# Patient Record
Sex: FEMALE | Race: WHITE | Employment: FULL TIME | URBAN - METROPOLITAN AREA
[De-identification: names, ages, dates, MRNs, and addresses within clinical notes are randomized per-mention and may not be internally consistent; named-entity substitution may affect disease eponyms.]

---

## 2017-03-13 RX ORDER — LORATADINE 10 MG
TABLET ORAL
Qty: 30 TABLET | Refills: 0 | Status: SHIPPED | OUTPATIENT
Start: 2017-03-13 | End: 2017-04-11 | Stop reason: SDUPTHER

## 2017-03-23 ENCOUNTER — OFFICE VISIT (OUTPATIENT)
Dept: PRIMARY CARE CLINIC | Age: 37
End: 2017-03-23

## 2017-03-23 VITALS
HEIGHT: 65 IN | TEMPERATURE: 98.5 F | DIASTOLIC BLOOD PRESSURE: 72 MMHG | SYSTOLIC BLOOD PRESSURE: 98 MMHG | BODY MASS INDEX: 19.99 KG/M2 | HEART RATE: 80 BPM | WEIGHT: 120 LBS | RESPIRATION RATE: 14 BRPM

## 2017-03-23 DIAGNOSIS — J01.00 ACUTE NON-RECURRENT MAXILLARY SINUSITIS: Primary | ICD-10-CM

## 2017-03-23 DIAGNOSIS — K13.79 MOUTH SORES: ICD-10-CM

## 2017-03-23 DIAGNOSIS — J30.9 ALLERGIC RHINITIS, UNSPECIFIED ALLERGIC RHINITIS TRIGGER, UNSPECIFIED RHINITIS SEASONALITY: ICD-10-CM

## 2017-03-23 PROCEDURE — 99214 OFFICE O/P EST MOD 30 MIN: CPT | Performed by: FAMILY MEDICINE

## 2017-03-23 RX ORDER — VALACYCLOVIR HYDROCHLORIDE 1 G/1
1000 TABLET, FILM COATED ORAL 2 TIMES DAILY
Qty: 20 TABLET | Refills: 3 | Status: SHIPPED | OUTPATIENT
Start: 2017-03-23 | End: 2017-05-13 | Stop reason: SDUPTHER

## 2017-03-23 RX ORDER — AMOXICILLIN AND CLAVULANATE POTASSIUM 875; 125 MG/1; MG/1
1 TABLET, FILM COATED ORAL 2 TIMES DAILY
Qty: 20 TABLET | Refills: 0 | Status: SHIPPED | OUTPATIENT
Start: 2017-03-23 | End: 2017-04-02

## 2017-03-23 ASSESSMENT — ENCOUNTER SYMPTOMS
COUGH: 0
CHEST TIGHTNESS: 0
ABDOMINAL PAIN: 0
WHEEZING: 0
DIARRHEA: 0
CONSTIPATION: 0
EYE DISCHARGE: 0
NAUSEA: 0
EYE REDNESS: 0
COLOR CHANGE: 0
SORE THROAT: 0
PHOTOPHOBIA: 0
EYE PAIN: 0
APNEA: 0
STRIDOR: 0
RHINORRHEA: 1
CHOKING: 0
FACIAL SWELLING: 0
SINUS PAIN: 1
VOMITING: 0
SWOLLEN GLANDS: 0

## 2017-04-11 RX ORDER — LORATADINE 10 MG
TABLET ORAL
Qty: 30 TABLET | Refills: 3 | Status: SHIPPED | OUTPATIENT
Start: 2017-04-11 | End: 2017-08-16 | Stop reason: SDUPTHER

## 2017-07-05 DIAGNOSIS — Z01.89 ENCOUNTER FOR BLOOD TEST: ICD-10-CM

## 2017-07-05 LAB
ABO/RH: NORMAL
ANTIBODY SCREEN: NORMAL

## 2017-08-16 RX ORDER — LORATADINE 10 MG
TABLET ORAL
Qty: 30 TABLET | Refills: 5 | Status: SHIPPED | OUTPATIENT
Start: 2017-08-16 | End: 2018-04-12 | Stop reason: SDUPTHER

## 2018-02-28 RX ORDER — LORATADINE 10 MG
TABLET ORAL
Qty: 30 TABLET | Refills: 0 | OUTPATIENT
Start: 2018-02-28

## 2018-02-28 NOTE — TELEPHONE ENCOUNTER
Patient was last seen 3-23-17    Patient is in need of an appointment      Please verify if the patient will need a short supply until their next appointment to go to their LOCAL pharmacy

## 2018-04-12 ENCOUNTER — OFFICE VISIT (OUTPATIENT)
Dept: PRIMARY CARE CLINIC | Age: 38
End: 2018-04-12
Payer: MEDICARE

## 2018-04-12 VITALS
BODY MASS INDEX: 19.66 KG/M2 | RESPIRATION RATE: 12 BRPM | HEIGHT: 65 IN | OXYGEN SATURATION: 99 % | WEIGHT: 118 LBS | HEART RATE: 100 BPM | TEMPERATURE: 98 F | DIASTOLIC BLOOD PRESSURE: 60 MMHG | SYSTOLIC BLOOD PRESSURE: 90 MMHG

## 2018-04-12 DIAGNOSIS — G24.5 EYE TWITCH: Primary | ICD-10-CM

## 2018-04-12 DIAGNOSIS — G43.829 MENSTRUAL MIGRAINE WITHOUT STATUS MIGRAINOSUS, NOT INTRACTABLE: ICD-10-CM

## 2018-04-12 DIAGNOSIS — Z12.4 CERVICAL CANCER SCREENING: ICD-10-CM

## 2018-04-12 DIAGNOSIS — H93.8X3 EAR CONGESTION, BILATERAL: ICD-10-CM

## 2018-04-12 DIAGNOSIS — J01.40 ACUTE NON-RECURRENT PANSINUSITIS: ICD-10-CM

## 2018-04-12 PROCEDURE — G8420 CALC BMI NORM PARAMETERS: HCPCS | Performed by: FAMILY MEDICINE

## 2018-04-12 PROCEDURE — 99214 OFFICE O/P EST MOD 30 MIN: CPT | Performed by: FAMILY MEDICINE

## 2018-04-12 PROCEDURE — G8427 DOCREV CUR MEDS BY ELIG CLIN: HCPCS | Performed by: FAMILY MEDICINE

## 2018-04-12 PROCEDURE — 1036F TOBACCO NON-USER: CPT | Performed by: FAMILY MEDICINE

## 2018-04-12 RX ORDER — AMOXICILLIN AND CLAVULANATE POTASSIUM 875; 125 MG/1; MG/1
1 TABLET, FILM COATED ORAL 2 TIMES DAILY
Qty: 20 TABLET | Refills: 0 | Status: SHIPPED | OUTPATIENT
Start: 2018-04-12 | End: 2018-06-18

## 2018-04-12 RX ORDER — LORATADINE AND PSEUDOEPHEDRINE 10; 240 MG/1; MG/1
TABLET, EXTENDED RELEASE ORAL
Qty: 30 TABLET | Refills: 5 | Status: SHIPPED | OUTPATIENT
Start: 2018-04-12 | End: 2018-09-24 | Stop reason: SDUPTHER

## 2018-04-12 RX ORDER — NARATRIPTAN 2.5 MG/1
2.5 TABLET ORAL
Qty: 9 TABLET | Refills: 1 | Status: SHIPPED | OUTPATIENT
Start: 2018-04-12 | End: 2018-04-12

## 2018-04-12 RX ORDER — LEVOFLOXACIN 500 MG/1
500 TABLET, FILM COATED ORAL DAILY
Qty: 10 TABLET | Refills: 0 | Status: CANCELLED | OUTPATIENT
Start: 2018-04-12 | End: 2018-04-22

## 2018-04-12 RX ORDER — FLUTICASONE PROPIONATE 50 MCG
2 SPRAY, SUSPENSION (ML) NASAL NIGHTLY
Qty: 1 BOTTLE | Refills: 2 | Status: SHIPPED | OUTPATIENT
Start: 2018-04-12 | End: 2018-07-10 | Stop reason: SDUPTHER

## 2018-04-12 ASSESSMENT — PATIENT HEALTH QUESTIONNAIRE - PHQ9
1. LITTLE INTEREST OR PLEASURE IN DOING THINGS: 0
SUM OF ALL RESPONSES TO PHQ9 QUESTIONS 1 & 2: 0
2. FEELING DOWN, DEPRESSED OR HOPELESS: 0
SUM OF ALL RESPONSES TO PHQ QUESTIONS 1-9: 0

## 2018-04-12 ASSESSMENT — ENCOUNTER SYMPTOMS
PHOTOPHOBIA: 0
WHEEZING: 0
SINUS PRESSURE: 1
EYE ITCHING: 0
SORE THROAT: 0
SWOLLEN GLANDS: 0
HOARSE VOICE: 0
ABDOMINAL PAIN: 0
EYE REDNESS: 0
GASTROINTESTINAL NEGATIVE: 1
CONSTIPATION: 0
EYE PAIN: 0
DIARRHEA: 0
SHORTNESS OF BREATH: 0
COUGH: 0
BACK PAIN: 0
RESPIRATORY NEGATIVE: 1

## 2018-06-18 ENCOUNTER — OFFICE VISIT (OUTPATIENT)
Dept: OBGYN CLINIC | Age: 38
End: 2018-06-18
Payer: MEDICARE

## 2018-06-18 VITALS
WEIGHT: 120 LBS | HEIGHT: 65 IN | BODY MASS INDEX: 19.99 KG/M2 | DIASTOLIC BLOOD PRESSURE: 74 MMHG | SYSTOLIC BLOOD PRESSURE: 116 MMHG

## 2018-06-18 DIAGNOSIS — Z01.419 WELL WOMAN EXAM WITH ROUTINE GYNECOLOGICAL EXAM: Primary | ICD-10-CM

## 2018-06-18 DIAGNOSIS — Z11.51 SCREENING FOR HPV (HUMAN PAPILLOMAVIRUS): ICD-10-CM

## 2018-06-18 DIAGNOSIS — R10.2 PELVIC PAIN IN FEMALE: ICD-10-CM

## 2018-06-18 PROCEDURE — 99385 PREV VISIT NEW AGE 18-39: CPT | Performed by: OBSTETRICS & GYNECOLOGY

## 2018-06-26 ENCOUNTER — HOSPITAL ENCOUNTER (OUTPATIENT)
Dept: ULTRASOUND IMAGING | Age: 38
Discharge: HOME | End: 2018-06-28
Payer: MEDICARE

## 2018-06-26 DIAGNOSIS — R10.2 PELVIC PAIN IN FEMALE: ICD-10-CM

## 2018-06-26 PROCEDURE — 76856 US EXAM PELVIC COMPLETE: CPT

## 2018-06-26 PROCEDURE — 76830 TRANSVAGINAL US NON-OB: CPT

## 2018-06-27 ENCOUNTER — TELEPHONE (OUTPATIENT)
Dept: OBGYN CLINIC | Age: 38
End: 2018-06-27

## 2018-06-27 DIAGNOSIS — Z11.51 SCREENING FOR HPV (HUMAN PAPILLOMAVIRUS): ICD-10-CM

## 2018-06-27 DIAGNOSIS — Z01.419 WELL WOMAN EXAM WITH ROUTINE GYNECOLOGICAL EXAM: ICD-10-CM

## 2018-07-10 DIAGNOSIS — J01.40 ACUTE NON-RECURRENT PANSINUSITIS: ICD-10-CM

## 2018-07-10 DIAGNOSIS — H93.8X3 EAR CONGESTION, BILATERAL: ICD-10-CM

## 2018-07-10 RX ORDER — FLUTICASONE PROPIONATE 50 MCG
SPRAY, SUSPENSION (ML) NASAL
Qty: 1 BOTTLE | Refills: 2 | Status: SHIPPED | OUTPATIENT
Start: 2018-07-10 | End: 2018-09-24 | Stop reason: SDUPTHER

## 2018-07-10 NOTE — TELEPHONE ENCOUNTER
Patient was last seen on 4-12-18  Last Prescribed 4-12-18    Medication is pending  Please approve or deny this request

## 2018-09-24 ENCOUNTER — OFFICE VISIT (OUTPATIENT)
Dept: PRIMARY CARE CLINIC | Age: 38
End: 2018-09-24
Payer: MEDICARE

## 2018-09-24 VITALS
HEART RATE: 81 BPM | RESPIRATION RATE: 14 BRPM | SYSTOLIC BLOOD PRESSURE: 110 MMHG | WEIGHT: 117 LBS | TEMPERATURE: 97.9 F | HEIGHT: 65 IN | BODY MASS INDEX: 19.49 KG/M2 | DIASTOLIC BLOOD PRESSURE: 64 MMHG | OXYGEN SATURATION: 99 %

## 2018-09-24 DIAGNOSIS — H92.03 OTALGIA OF BOTH EARS: ICD-10-CM

## 2018-09-24 DIAGNOSIS — J30.9 ALLERGIC RHINITIS, UNSPECIFIED SEASONALITY, UNSPECIFIED TRIGGER: Primary | ICD-10-CM

## 2018-09-24 DIAGNOSIS — H69.83 DYSFUNCTION OF BOTH EUSTACHIAN TUBES: ICD-10-CM

## 2018-09-24 DIAGNOSIS — H93.8X3 EAR CONGESTION, BILATERAL: ICD-10-CM

## 2018-09-24 DIAGNOSIS — J01.40 ACUTE NON-RECURRENT PANSINUSITIS: ICD-10-CM

## 2018-09-24 PROCEDURE — G8427 DOCREV CUR MEDS BY ELIG CLIN: HCPCS | Performed by: FAMILY MEDICINE

## 2018-09-24 PROCEDURE — G8420 CALC BMI NORM PARAMETERS: HCPCS | Performed by: FAMILY MEDICINE

## 2018-09-24 PROCEDURE — 1036F TOBACCO NON-USER: CPT | Performed by: FAMILY MEDICINE

## 2018-09-24 PROCEDURE — 99213 OFFICE O/P EST LOW 20 MIN: CPT | Performed by: FAMILY MEDICINE

## 2018-09-24 RX ORDER — AZELASTINE 1 MG/ML
2 SPRAY, METERED NASAL 2 TIMES DAILY
Qty: 1 BOTTLE | Refills: 3 | Status: SHIPPED | OUTPATIENT
Start: 2018-09-24 | End: 2018-11-17

## 2018-09-24 RX ORDER — CEFUROXIME AXETIL 500 MG/1
500 TABLET ORAL 2 TIMES DAILY
Qty: 20 TABLET | Refills: 0 | Status: SHIPPED | OUTPATIENT
Start: 2018-09-24 | End: 2018-10-04

## 2018-09-24 RX ORDER — MONTELUKAST SODIUM 10 MG/1
10 TABLET ORAL NIGHTLY
Qty: 30 TABLET | Refills: 3 | Status: SHIPPED | OUTPATIENT
Start: 2018-09-24 | End: 2018-11-17

## 2018-09-24 RX ORDER — FLUTICASONE PROPIONATE 50 MCG
1 SPRAY, SUSPENSION (ML) NASAL DAILY
Qty: 1 BOTTLE | Refills: 2 | Status: SHIPPED | OUTPATIENT
Start: 2018-09-24 | End: 2019-03-19 | Stop reason: SDUPTHER

## 2018-09-24 RX ORDER — LORATADINE AND PSEUDOEPHEDRINE 10; 240 MG/1; MG/1
TABLET, EXTENDED RELEASE ORAL
Qty: 30 TABLET | Refills: 5 | Status: SHIPPED | OUTPATIENT
Start: 2018-09-24 | End: 2019-04-05 | Stop reason: SDUPTHER

## 2018-09-24 ASSESSMENT — ENCOUNTER SYMPTOMS
COLOR CHANGE: 0
CONSTIPATION: 0
COUGH: 0
RHINORRHEA: 0
EYE DISCHARGE: 0
CHEST TIGHTNESS: 0
ABDOMINAL PAIN: 0
DIARRHEA: 0
EYE REDNESS: 0
FACIAL SWELLING: 0
EYE PAIN: 0
STRIDOR: 0
APNEA: 0
VOMITING: 0
SORE THROAT: 0
PHOTOPHOBIA: 0
WHEEZING: 0
CHOKING: 0
NAUSEA: 0

## 2018-09-24 NOTE — PROGRESS NOTES
Narrative    No narrative on file     Allergies:  Cefdinir; Cephalexin; Levofloxacin; Sulfamethoxazole-trimethoprim; and Septra [bactrim]    Review of Systems   Constitutional: Negative for activity change, appetite change, chills, diaphoresis, fatigue and fever. HENT: Positive for ear pain and hearing loss (since resolved). Negative for congestion, ear discharge, facial swelling, mouth sores, rhinorrhea and sore throat. Eyes: Negative for photophobia, pain, discharge and redness. Respiratory: Negative for apnea, cough, choking, chest tightness, wheezing and stridor. Cardiovascular: Negative for chest pain, palpitations and leg swelling. Gastrointestinal: Negative for abdominal pain, constipation, diarrhea, nausea and vomiting. Endocrine: Negative for cold intolerance, heat intolerance, polydipsia and polyphagia. Genitourinary: Negative for dysuria and frequency. Musculoskeletal: Negative for gait problem, joint swelling, neck pain and neck stiffness. Skin: Negative for color change, pallor, rash and wound. Allergic/Immunologic: Negative for immunocompromised state. Neurological: Negative for tremors, syncope, facial asymmetry, speech difficulty and headaches. Psychiatric/Behavioral: Negative for confusion and hallucinations. The patient is not nervous/anxious and is not hyperactive. Objective:   /64 (Site: Left Upper Arm, Position: Sitting, Cuff Size: Medium Adult)   Pulse 81   Temp 97.9 °F (36.6 °C) (Oral)   Resp 14   Ht 5' 5\" (1.651 m)   Wt 117 lb (53.1 kg)   LMP 09/23/2018   SpO2 99%   BMI 19.47 kg/m²     Physical Exam   Constitutional: She is oriented to person, place, and time. She appears well-developed and well-nourished. No distress. HENT:   Head: Normocephalic and atraumatic. Right Ear: Hearing and external ear normal. No drainage, swelling or tenderness. Tympanic membrane is not injected and not bulging. A middle ear effusion is present. No hemotympanum.

## 2018-10-18 DIAGNOSIS — H93.8X3 EAR CONGESTION, BILATERAL: ICD-10-CM

## 2018-10-18 RX ORDER — LORATADINE 10 MG
TABLET ORAL
Qty: 30 TABLET | Refills: 0 | Status: SHIPPED | OUTPATIENT
Start: 2018-10-18 | End: 2018-11-17 | Stop reason: SDUPTHER

## 2018-11-17 ENCOUNTER — OFFICE VISIT (OUTPATIENT)
Dept: PRIMARY CARE CLINIC | Age: 38
End: 2018-11-17
Payer: MEDICARE

## 2018-11-17 VITALS
OXYGEN SATURATION: 97 % | DIASTOLIC BLOOD PRESSURE: 78 MMHG | RESPIRATION RATE: 14 BRPM | HEIGHT: 65 IN | SYSTOLIC BLOOD PRESSURE: 120 MMHG | TEMPERATURE: 98 F | WEIGHT: 121.9 LBS | BODY MASS INDEX: 20.31 KG/M2 | HEART RATE: 87 BPM

## 2018-11-17 DIAGNOSIS — J01.00 ACUTE NON-RECURRENT MAXILLARY SINUSITIS: Primary | ICD-10-CM

## 2018-11-17 PROCEDURE — 1036F TOBACCO NON-USER: CPT | Performed by: PHYSICIAN ASSISTANT

## 2018-11-17 PROCEDURE — 99213 OFFICE O/P EST LOW 20 MIN: CPT | Performed by: PHYSICIAN ASSISTANT

## 2018-11-17 PROCEDURE — G8484 FLU IMMUNIZE NO ADMIN: HCPCS | Performed by: PHYSICIAN ASSISTANT

## 2018-11-17 PROCEDURE — G8420 CALC BMI NORM PARAMETERS: HCPCS | Performed by: PHYSICIAN ASSISTANT

## 2018-11-17 PROCEDURE — G8427 DOCREV CUR MEDS BY ELIG CLIN: HCPCS | Performed by: PHYSICIAN ASSISTANT

## 2018-11-17 RX ORDER — AMOXICILLIN AND CLAVULANATE POTASSIUM 875; 125 MG/1; MG/1
1 TABLET, FILM COATED ORAL 2 TIMES DAILY
Qty: 20 TABLET | Refills: 0 | Status: SHIPPED | OUTPATIENT
Start: 2018-11-17 | End: 2018-11-27

## 2018-11-17 ASSESSMENT — ENCOUNTER SYMPTOMS
SHORTNESS OF BREATH: 0
HOARSE VOICE: 0
SINUS PRESSURE: 1
SWOLLEN GLANDS: 0
SORE THROAT: 1
COUGH: 1

## 2019-01-30 ENCOUNTER — OFFICE VISIT (OUTPATIENT)
Dept: PRIMARY CARE CLINIC | Age: 39
End: 2019-01-30
Payer: MEDICARE

## 2019-01-30 VITALS
RESPIRATION RATE: 16 BRPM | DIASTOLIC BLOOD PRESSURE: 78 MMHG | WEIGHT: 122 LBS | HEART RATE: 103 BPM | TEMPERATURE: 98.4 F | OXYGEN SATURATION: 98 % | BODY MASS INDEX: 20.33 KG/M2 | SYSTOLIC BLOOD PRESSURE: 116 MMHG | HEIGHT: 65 IN

## 2019-01-30 DIAGNOSIS — L98.9 SKIN LESION: Primary | ICD-10-CM

## 2019-01-30 PROCEDURE — G8427 DOCREV CUR MEDS BY ELIG CLIN: HCPCS | Performed by: PHYSICIAN ASSISTANT

## 2019-01-30 PROCEDURE — 99213 OFFICE O/P EST LOW 20 MIN: CPT | Performed by: PHYSICIAN ASSISTANT

## 2019-01-30 PROCEDURE — G8484 FLU IMMUNIZE NO ADMIN: HCPCS | Performed by: PHYSICIAN ASSISTANT

## 2019-01-30 PROCEDURE — 1036F TOBACCO NON-USER: CPT | Performed by: PHYSICIAN ASSISTANT

## 2019-01-30 PROCEDURE — G8420 CALC BMI NORM PARAMETERS: HCPCS | Performed by: PHYSICIAN ASSISTANT

## 2019-01-30 RX ORDER — CLOTRIMAZOLE AND BETAMETHASONE DIPROPIONATE 10; .64 MG/G; MG/G
CREAM TOPICAL
Qty: 30 G | Refills: 0 | Status: SHIPPED | OUTPATIENT
Start: 2019-01-30

## 2019-01-30 RX ORDER — PRENATAL VIT 91/IRON/FOLIC/DHA 28-975-200
COMBINATION PACKAGE (EA) ORAL
Qty: 30 G | Refills: 0 | Status: CANCELLED | OUTPATIENT
Start: 2019-01-30

## 2019-01-30 ASSESSMENT — ENCOUNTER SYMPTOMS
DIARRHEA: 0
SORE THROAT: 0
RHINORRHEA: 0
EYE PAIN: 0
SHORTNESS OF BREATH: 0
NAIL CHANGES: 0

## 2019-03-19 DIAGNOSIS — J30.9 ALLERGIC RHINITIS, UNSPECIFIED SEASONALITY, UNSPECIFIED TRIGGER: ICD-10-CM

## 2019-03-19 DIAGNOSIS — H93.8X3 EAR CONGESTION, BILATERAL: ICD-10-CM

## 2019-03-19 DIAGNOSIS — J01.40 ACUTE NON-RECURRENT PANSINUSITIS: ICD-10-CM

## 2019-03-19 RX ORDER — FLUTICASONE PROPIONATE 50 MCG
SPRAY, SUSPENSION (ML) NASAL
Qty: 1 BOTTLE | Refills: 5 | Status: SHIPPED | OUTPATIENT
Start: 2019-03-19

## 2019-04-05 DIAGNOSIS — H93.8X3 EAR CONGESTION, BILATERAL: ICD-10-CM

## 2019-04-05 DIAGNOSIS — J30.9 ALLERGIC RHINITIS, UNSPECIFIED SEASONALITY, UNSPECIFIED TRIGGER: ICD-10-CM

## 2019-04-05 RX ORDER — LORATADINE AND PSEUDOEPHEDRINE 10; 240 MG/1; MG/1
TABLET, EXTENDED RELEASE ORAL
Qty: 30 TABLET | Refills: 0 | Status: SHIPPED | OUTPATIENT
Start: 2019-04-05

## 2019-04-05 NOTE — TELEPHONE ENCOUNTER
Ledy pt requesting refill, aware of 30-day courtesy supply, will try to f/u with Dr. Anneliese Hughes NA

## 2023-02-10 PROBLEM — R51.9 HEADACHE: Status: ACTIVE | Noted: 2023-02-10

## 2023-02-10 PROBLEM — R52 BODY ACHES: Status: ACTIVE | Noted: 2023-02-10

## 2023-02-10 PROBLEM — R30.0 DYSURIA: Status: ACTIVE | Noted: 2023-02-10

## 2023-02-10 PROBLEM — L29.9 ITCHY SKIN: Status: ACTIVE | Noted: 2023-02-10

## 2023-02-10 PROBLEM — N63.0 BREAST LUMP: Status: ACTIVE | Noted: 2023-02-10

## 2023-02-10 PROBLEM — M54.50 LOWER BACK PAIN: Status: ACTIVE | Noted: 2023-02-10

## 2023-02-10 PROBLEM — R19.7 INTRACTABLE DIARRHEA: Status: ACTIVE | Noted: 2023-02-10

## 2023-02-10 PROBLEM — R68.89 FLU-LIKE SYMPTOMS: Status: ACTIVE | Noted: 2023-02-10

## 2023-02-10 PROBLEM — R11.0 NAUSEA IN ADULT: Status: ACTIVE | Noted: 2023-02-10

## 2023-02-10 PROBLEM — J01.90 ACUTE SINUSITIS: Status: ACTIVE | Noted: 2023-02-10

## 2023-02-10 PROBLEM — M62.838 NIGHT MUSCLE SPASMS: Status: ACTIVE | Noted: 2023-02-10

## 2023-02-10 PROBLEM — K52.9 GASTROENTERITIS: Status: ACTIVE | Noted: 2023-02-10

## 2023-02-10 PROBLEM — N39.0 UTI (URINARY TRACT INFECTION): Status: ACTIVE | Noted: 2023-02-10

## 2023-02-10 PROBLEM — K52.9 AGE (ACUTE GASTROENTERITIS): Status: ACTIVE | Noted: 2023-02-10

## 2023-02-10 PROBLEM — R10.9 ABDOMINAL PAIN: Status: ACTIVE | Noted: 2023-02-10

## 2023-02-10 PROBLEM — B35.4 TINEA CORPORIS: Status: ACTIVE | Noted: 2023-02-10

## 2023-02-10 PROBLEM — R23.8 IRRITATION SYMPTOM OF SKIN: Status: ACTIVE | Noted: 2023-02-10

## 2023-02-10 PROBLEM — H11.30 SUBCONJUNCTIVAL HEMORRHAGE: Status: ACTIVE | Noted: 2023-02-10

## 2023-02-10 PROBLEM — M25.50 JOINT PAIN: Status: ACTIVE | Noted: 2023-02-10

## 2023-02-10 PROBLEM — K52.9 COLITIS: Status: ACTIVE | Noted: 2023-02-10

## 2023-02-10 PROBLEM — R53.83 FATIGUE: Status: ACTIVE | Noted: 2023-02-10

## 2023-02-10 PROBLEM — H10.10 ALLERGIC CONJUNCTIVITIS: Status: ACTIVE | Noted: 2023-02-10

## 2023-02-10 PROBLEM — U07.1 COVID-19 VIRUS INFECTION: Status: ACTIVE | Noted: 2023-02-10

## 2023-02-10 PROBLEM — J30.9 ALLERGIC RHINITIS: Status: ACTIVE | Noted: 2023-02-10

## 2023-02-10 PROBLEM — R19.7 DIARRHEA: Status: ACTIVE | Noted: 2023-02-10

## 2023-02-10 PROBLEM — B35.1 ONYCHOMYCOSIS: Status: ACTIVE | Noted: 2023-02-10

## 2023-02-10 PROBLEM — R05.9 COUGH: Status: ACTIVE | Noted: 2023-02-10

## 2023-02-10 PROBLEM — J02.9 SORE THROAT: Status: ACTIVE | Noted: 2023-02-10

## 2023-02-10 PROBLEM — M26.69 TMJ INFLAMMATION: Status: ACTIVE | Noted: 2023-02-10

## 2023-02-10 PROBLEM — J06.9 URI, ACUTE: Status: ACTIVE | Noted: 2023-02-10

## 2023-02-10 PROBLEM — J01.40 ACUTE NON-RECURRENT PANSINUSITIS: Status: ACTIVE | Noted: 2023-02-10

## 2023-02-10 RX ORDER — FLUTICASONE PROPIONATE 50 MCG
1 SPRAY, SUSPENSION (ML) NASAL DAILY
COMMUNITY
Start: 2018-10-18 | End: 2023-06-29 | Stop reason: SDUPTHER

## 2023-02-10 RX ORDER — LORATADINE PSEUDOEPHEDRINE SULFATE 10; 240 MG/1; MG/1
1 TABLET, EXTENDED RELEASE ORAL DAILY
COMMUNITY
End: 2023-06-15 | Stop reason: SDUPTHER

## 2023-02-10 RX ORDER — ALBUTEROL SULFATE 90 UG/1
1-2 AEROSOL, METERED RESPIRATORY (INHALATION)
COMMUNITY

## 2023-02-10 RX ORDER — TRAZODONE HYDROCHLORIDE 50 MG/1
.5-1 TABLET ORAL NIGHTLY
COMMUNITY
End: 2023-09-19 | Stop reason: ALTCHOICE

## 2023-03-07 ENCOUNTER — TELEMEDICINE (OUTPATIENT)
Dept: PRIMARY CARE | Facility: CLINIC | Age: 43
End: 2023-03-07
Payer: MEDICAID

## 2023-03-07 DIAGNOSIS — R53.82 CHRONIC FATIGUE: Primary | Chronic | ICD-10-CM

## 2023-03-07 PROCEDURE — 99213 OFFICE O/P EST LOW 20 MIN: CPT | Performed by: FAMILY MEDICINE

## 2023-03-07 RX ORDER — CEFUROXIME AXETIL 500 MG/1
500 TABLET ORAL 2 TIMES DAILY
Qty: 20 TABLET | Refills: 0 | Status: SHIPPED | OUTPATIENT
Start: 2023-03-07 | End: 2023-03-17

## 2023-03-07 NOTE — PATIENT INSTRUCTIONS
-Continue current medications and therapy for chronic medical conditions.     -reviewed last labs     -start ceftin if needed.

## 2023-03-07 NOTE — PROGRESS NOTES
Subjective   Patient ID: Brii Reeves is a 42 y.o. female who presents for Fatigue (Pt has c.o being run down and tired all the time. Pt would like a script for an antibiotic to keep on hand. )    Review of Systems   All other systems reviewed and are negative.      Objective   There were no vitals taken for this visit.    Physical Exam GEN: pleasant, alert, NAD, talkative and easy to discuss symptoms with.  No Telephonic distress     Assessment/Plan   Problem List Items Addressed This Visit       Fatigue - Primary     Discussed possible wellbutrin in the future if needed.

## 2023-06-15 DIAGNOSIS — J01.10 ACUTE FRONTAL SINUSITIS, RECURRENCE NOT SPECIFIED: ICD-10-CM

## 2023-06-16 RX ORDER — LORATADINE PSEUDOEPHEDRINE SULFATE 10; 240 MG/1; MG/1
1 TABLET, EXTENDED RELEASE ORAL DAILY
Qty: 30 TABLET | Refills: 1 | Status: SHIPPED | OUTPATIENT
Start: 2023-06-16 | End: 2023-09-14 | Stop reason: SDUPTHER

## 2023-06-29 DIAGNOSIS — J30.1 SEASONAL ALLERGIC RHINITIS DUE TO POLLEN: ICD-10-CM

## 2023-06-29 RX ORDER — FLUTICASONE PROPIONATE 50 MCG
1 SPRAY, SUSPENSION (ML) NASAL DAILY
Qty: 16 G | Refills: 3 | Status: SHIPPED | OUTPATIENT
Start: 2023-06-29 | End: 2023-07-07 | Stop reason: SDUPTHER

## 2023-06-29 NOTE — TELEPHONE ENCOUNTER
Pt requests script refill of:   FLUTICASONE 50 MCG/actuation nasal spray    Once daily each nostril    [facility]:   [facility]     Please advise pt @ [phone]

## 2023-07-03 DIAGNOSIS — J30.1 SEASONAL ALLERGIC RHINITIS DUE TO POLLEN: ICD-10-CM

## 2023-07-03 NOTE — TELEPHONE ENCOUNTER
JE WROTE PT'S FLONASE SCRIPT WRONG AND NOW IT ISN'T BEING COVERED.  SHE TAKES 1 PUFF IN EACH NOSTRIL 2XS A DAY.    HOW THE PREVIOUS SCRIPT WAS WROTE

## 2023-07-07 RX ORDER — FLUTICASONE PROPIONATE 50 MCG
1 SPRAY, SUSPENSION (ML) NASAL 2 TIMES DAILY
Qty: 16 G | Refills: 3 | Status: SHIPPED | OUTPATIENT
Start: 2023-07-07 | End: 2023-11-01 | Stop reason: SDUPTHER

## 2023-09-14 DIAGNOSIS — J01.10 ACUTE FRONTAL SINUSITIS, RECURRENCE NOT SPECIFIED: ICD-10-CM

## 2023-09-14 NOTE — TELEPHONE ENCOUNTER
Dr Mcdonald pt    Pt phoned office and is requesting refill on     Loratadine- pseudoephedrine    [facility]

## 2023-09-15 RX ORDER — LORATADINE PSEUDOEPHEDRINE SULFATE 10; 240 MG/1; MG/1
1 TABLET, EXTENDED RELEASE ORAL DAILY
Qty: 30 TABLET | Refills: 1 | Status: SHIPPED | OUTPATIENT
Start: 2023-09-15 | End: 2023-11-01 | Stop reason: SDUPTHER

## 2023-09-19 ENCOUNTER — OFFICE VISIT (OUTPATIENT)
Dept: PRIMARY CARE | Facility: CLINIC | Age: 43
End: 2023-09-19
Payer: MEDICAID

## 2023-09-19 VITALS
SYSTOLIC BLOOD PRESSURE: 124 MMHG | OXYGEN SATURATION: 98 % | HEIGHT: 66 IN | RESPIRATION RATE: 14 BRPM | HEART RATE: 77 BPM | WEIGHT: 127 LBS | DIASTOLIC BLOOD PRESSURE: 78 MMHG | BODY MASS INDEX: 20.41 KG/M2

## 2023-09-19 DIAGNOSIS — J32.9 SINUSITIS, UNSPECIFIED CHRONICITY, UNSPECIFIED LOCATION: ICD-10-CM

## 2023-09-19 PROBLEM — M62.838 NIGHT MUSCLE SPASMS: Status: RESOLVED | Noted: 2023-02-10 | Resolved: 2023-09-19

## 2023-09-19 PROBLEM — H10.10 ALLERGIC CONJUNCTIVITIS: Status: RESOLVED | Noted: 2023-02-10 | Resolved: 2023-09-19

## 2023-09-19 PROBLEM — R19.7 DIARRHEA: Status: RESOLVED | Noted: 2023-02-10 | Resolved: 2023-09-19

## 2023-09-19 PROBLEM — J30.9 ALLERGIC RHINITIS: Status: RESOLVED | Noted: 2023-02-10 | Resolved: 2023-09-19

## 2023-09-19 PROBLEM — U07.1 COVID-19 VIRUS INFECTION: Status: RESOLVED | Noted: 2023-02-10 | Resolved: 2023-09-19

## 2023-09-19 PROBLEM — M26.69 TMJ INFLAMMATION: Status: RESOLVED | Noted: 2023-02-10 | Resolved: 2023-09-19

## 2023-09-19 PROBLEM — R23.8 IRRITATION SYMPTOM OF SKIN: Status: RESOLVED | Noted: 2023-02-10 | Resolved: 2023-09-19

## 2023-09-19 PROBLEM — J06.9 URI, ACUTE: Status: RESOLVED | Noted: 2023-02-10 | Resolved: 2023-09-19

## 2023-09-19 PROBLEM — N39.0 UTI (URINARY TRACT INFECTION): Status: RESOLVED | Noted: 2023-02-10 | Resolved: 2023-09-19

## 2023-09-19 PROBLEM — M25.50 JOINT PAIN: Status: RESOLVED | Noted: 2023-02-10 | Resolved: 2023-09-19

## 2023-09-19 PROBLEM — J02.9 SORE THROAT: Status: RESOLVED | Noted: 2023-02-10 | Resolved: 2023-09-19

## 2023-09-19 PROBLEM — R52 BODY ACHES: Status: RESOLVED | Noted: 2023-02-10 | Resolved: 2023-09-19

## 2023-09-19 PROBLEM — R05.9 COUGH: Status: RESOLVED | Noted: 2023-02-10 | Resolved: 2023-09-19

## 2023-09-19 PROBLEM — H11.30 SUBCONJUNCTIVAL HEMORRHAGE: Status: RESOLVED | Noted: 2023-02-10 | Resolved: 2023-09-19

## 2023-09-19 PROBLEM — J01.90 ACUTE SINUSITIS: Status: RESOLVED | Noted: 2023-02-10 | Resolved: 2023-09-19

## 2023-09-19 PROBLEM — M54.50 LOWER BACK PAIN: Status: RESOLVED | Noted: 2023-02-10 | Resolved: 2023-09-19

## 2023-09-19 PROBLEM — R68.89 FLU-LIKE SYMPTOMS: Status: RESOLVED | Noted: 2023-02-10 | Resolved: 2023-09-19

## 2023-09-19 PROCEDURE — 99213 OFFICE O/P EST LOW 20 MIN: CPT | Performed by: FAMILY MEDICINE

## 2023-09-19 RX ORDER — PREDNISONE 10 MG/1
TABLET ORAL
Qty: 20 TABLET | Refills: 0 | Status: SHIPPED | OUTPATIENT
Start: 2023-09-19 | End: 2023-11-01 | Stop reason: ALTCHOICE

## 2023-09-19 RX ORDER — CEFUROXIME AXETIL 500 MG/1
500 TABLET ORAL 2 TIMES DAILY
Qty: 20 TABLET | Refills: 0 | Status: SHIPPED | OUTPATIENT
Start: 2023-09-19 | End: 2023-09-29

## 2023-09-19 NOTE — PROGRESS NOTES
Subjective   Patient ID: Brii Reeves is a 42 y.o. female who presents for Sinusitis.    Sinusitis     Pt states she has some sinus issues, she states she has pressure in her cheeks.She states she thinks I might be a dry sinusitis. Pt uses mouth guard at night.     12 Systems have been reviewed as follows.  Constitutional: Fever, weight gain, weight loss, appetite change, night sweats, fatigue, chills.  Eyes : blurry, double vision, vision, loss, tearing, redness, pain, sensitivity to light, glaucoma.  Ears: nose, mouth, and throat: Hearing loss, ringing in the ears, ear pain, nasal congestion, nasal drainage, nosebleeds, mouth, throat, irritation tooth problem.  Cardiovascular: chest pain, pressure, heart racing, palpitations, sweating, leg swelling, high or low blood pressure  Pulmonary: Cough, yellow or green sputum, blood and sputum, shortness of breath, wheezing  Gastrointestinal: Nausea, vomiting, diarrhea, constipation, pain, blood in stool, or vomitus, heartburn, difficulty swallowing  Genitourinary: incontinence, abnormal bleeding, abnormal discharge, urinary frequency, urinary hesitancy, pain, impotence sexual problem, infection, urinary retention  Musculoskeletal: Pain, stiffness, joint, redness or warmth, arthritis, back pain, weakness, muscle wasting, sprain or fracture  Neuro: Weight weakness, dizziness, change in voice, change in taste change in vision, change in hearing, loss, or change of sensation, trouble walking, balance problems coordination problems, shaking, speech problem  Endocrine: cold or heat intolerance, blood sugar problem, weight gain or loss missed periods hot flashes, sweats, change in body hair, change in libido, increased thirst, increased urination  Heme/lymph: Swelling, bleeding, problem anemia, bruising, enlarged lymph nodes  Allergic/immunologic: H. plus nasal drip, watery itchy eyes, nasal drainage, immunosuppressed  The above were reviewed and noted negative except as noted  "in HPI and Problem List.      Objective   /78 (BP Location: Left arm, Patient Position: Sitting, BP Cuff Size: Adult)   Pulse 77   Resp 14   Ht 1.676 m (5' 6\")   Wt 57.6 kg (127 lb)   LMP 09/09/2023   SpO2 98%   BMI 20.50 kg/m²     Physical Exam CONSTITUTIONAL- NAD, Pt is A & O x3, Vital signs reviewed per chart.  General Appearance- normal , good hygiene,talks easily  EYES-PERRLA conjunctiva and lids- normal  EARS/NOSE-TM's normal, head normocephalic and atraumatic, naso pharynx-no nasal discharge, no trismus,  oropharynx- normal without exudate  NECK- supple, FROM, without mass  thyroid- NT, normal size, no nodule noted  LYMPH- WNL  CV- RRR without murmur, gallop, or other abnormality.  PULM- CTA bilaterally  Respiratory effort- normal respiratory effort , no retractions or nasal flaring  ABDOMEN- normoactive BS's , soft , NT, no hepatosplenomegaly palpated, or masses. No pulsatile masses noted  extremities no edema,NT  SKIN- no abnormal skin lesions on inspection or palpation  neuro- no focal deficits  PSYCH- pleasant, normal judgement and insight     Assessment/Plan   Problem List Items Addressed This Visit    None  Visit Diagnoses       Sinusitis, unspecified chronicity, unspecified location                 Scribe Attestation  By signing my name below, Lourdes SEN MA  , Scribe   attest that this documentation has been prepared under the direction and in the presence of Jose Mcdonald DO.  "

## 2023-10-15 ENCOUNTER — HOSPITAL ENCOUNTER (EMERGENCY)
Facility: HOSPITAL | Age: 43
Discharge: HOME | End: 2023-10-15
Attending: STUDENT IN AN ORGANIZED HEALTH CARE EDUCATION/TRAINING PROGRAM
Payer: MEDICAID

## 2023-10-15 ENCOUNTER — APPOINTMENT (OUTPATIENT)
Dept: RADIOLOGY | Facility: HOSPITAL | Age: 43
End: 2023-10-15
Payer: MEDICAID

## 2023-10-15 VITALS
HEIGHT: 66 IN | BODY MASS INDEX: 19.29 KG/M2 | TEMPERATURE: 97.7 F | WEIGHT: 120 LBS | SYSTOLIC BLOOD PRESSURE: 130 MMHG | RESPIRATION RATE: 16 BRPM | OXYGEN SATURATION: 100 % | HEART RATE: 80 BPM | DIASTOLIC BLOOD PRESSURE: 70 MMHG
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
## 2023-10-15 PROCEDURE — 70450 CT HEAD/BRAIN W/O DYE: CPT

## 2023-10-15 PROCEDURE — 2500000004 HC RX 250 GENERAL PHARMACY W/ HCPCS (ALT 636 FOR OP/ED): Performed by: STUDENT IN AN ORGANIZED HEALTH CARE EDUCATION/TRAINING PROGRAM

## 2023-10-15 PROCEDURE — 99285 EMERGENCY DEPT VISIT HI MDM: CPT | Mod: 25

## 2023-10-15 PROCEDURE — 73030 X-RAY EXAM OF SHOULDER: CPT | Mod: LT,FR

## 2023-10-15 PROCEDURE — 72125 CT NECK SPINE W/O DYE: CPT | Performed by: RADIOLOGY

## 2023-10-15 PROCEDURE — 73030 X-RAY EXAM OF SHOULDER: CPT | Mod: RT,FR

## 2023-10-15 PROCEDURE — 73030 X-RAY EXAM OF SHOULDER: CPT | Mod: RIGHT SIDE | Performed by: RADIOLOGY

## 2023-10-15 PROCEDURE — 99284 EMERGENCY DEPT VISIT MOD MDM: CPT | Performed by: STUDENT IN AN ORGANIZED HEALTH CARE EDUCATION/TRAINING PROGRAM

## 2023-10-15 PROCEDURE — 72125 CT NECK SPINE W/O DYE: CPT

## 2023-10-15 PROCEDURE — 73030 X-RAY EXAM OF SHOULDER: CPT | Mod: LEFT SIDE | Performed by: RADIOLOGY

## 2023-10-15 PROCEDURE — 70450 CT HEAD/BRAIN W/O DYE: CPT | Performed by: RADIOLOGY

## 2023-10-15 PROCEDURE — 96372 THER/PROPH/DIAG INJ SC/IM: CPT

## 2023-10-15 RX ORDER — KETOROLAC TROMETHAMINE 30 MG/ML
30 INJECTION, SOLUTION INTRAMUSCULAR; INTRAVENOUS ONCE
Status: COMPLETED | OUTPATIENT
Start: 2023-10-15 | End: 2023-10-15

## 2023-10-15 RX ORDER — METHOCARBAMOL 750 MG/1
750 TABLET, FILM COATED ORAL 3 TIMES DAILY PRN
Qty: 10 TABLET | Refills: 0 | Status: SHIPPED | OUTPATIENT
Start: 2023-10-15

## 2023-10-15 RX ADMIN — KETOROLAC TROMETHAMINE 30 MG: 60 INJECTION, SOLUTION INTRAMUSCULAR at 04:20

## 2023-10-15 ASSESSMENT — PAIN SCALES - GENERAL
PAINLEVEL_OUTOF10: 7

## 2023-10-15 ASSESSMENT — PAIN - FUNCTIONAL ASSESSMENT
PAIN_FUNCTIONAL_ASSESSMENT: 0-10
PAIN_FUNCTIONAL_ASSESSMENT: 0-10

## 2023-10-15 ASSESSMENT — PAIN DESCRIPTION - LOCATION: LOCATION: NECK

## 2023-10-15 ASSESSMENT — LIFESTYLE VARIABLES
HAVE YOU EVER FELT YOU SHOULD CUT DOWN ON YOUR DRINKING: NO
EVER HAD A DRINK FIRST THING IN THE MORNING TO STEADY YOUR NERVES TO GET RID OF A HANGOVER: NO
HAVE PEOPLE ANNOYED YOU BY CRITICIZING YOUR DRINKING: NO
REASON UNABLE TO ASSESS: NO
EVER FELT BAD OR GUILTY ABOUT YOUR DRINKING: NO

## 2023-10-15 ASSESSMENT — COLUMBIA-SUICIDE SEVERITY RATING SCALE - C-SSRS
1. IN THE PAST MONTH, HAVE YOU WISHED YOU WERE DEAD OR WISHED YOU COULD GO TO SLEEP AND NOT WAKE UP?: NO
6. HAVE YOU EVER DONE ANYTHING, STARTED TO DO ANYTHING, OR PREPARED TO DO ANYTHING TO END YOUR LIFE?: NO
2. HAVE YOU ACTUALLY HAD ANY THOUGHTS OF KILLING YOURSELF?: NO

## 2023-10-15 NOTE — DISCHARGE INSTRUCTIONS
Return to the ER for suddenly worsening pain or vomiting.  Alternate ibuprofen and Tylenol every 4 hours to help with symptoms.  You can take the muscle relaxers as well.
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
## 2023-11-01 ENCOUNTER — OFFICE VISIT (OUTPATIENT)
Dept: PRIMARY CARE | Facility: CLINIC | Age: 43
End: 2023-11-01
Payer: MEDICAID

## 2023-11-01 VITALS
SYSTOLIC BLOOD PRESSURE: 134 MMHG | BODY MASS INDEX: 20.73 KG/M2 | DIASTOLIC BLOOD PRESSURE: 74 MMHG | OXYGEN SATURATION: 97 % | HEIGHT: 66 IN | WEIGHT: 129 LBS | HEART RATE: 74 BPM
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
## 2023-11-01 DIAGNOSIS — S16.1XXS STRAIN OF NECK MUSCLE, SEQUELA: ICD-10-CM

## 2023-11-01 DIAGNOSIS — S39.012S STRAIN OF LUMBAR REGION, SEQUELA: ICD-10-CM

## 2023-11-01 DIAGNOSIS — J01.10 ACUTE FRONTAL SINUSITIS, RECURRENCE NOT SPECIFIED: ICD-10-CM

## 2023-11-01 DIAGNOSIS — M54.2 NECK PAIN: ICD-10-CM

## 2023-11-01 DIAGNOSIS — J30.1 SEASONAL ALLERGIC RHINITIS DUE TO POLLEN: ICD-10-CM

## 2023-11-01 DIAGNOSIS — M62.838 CERVICAL PARASPINAL MUSCLE SPASM: ICD-10-CM

## 2023-11-01 DIAGNOSIS — R11.0 NAUSEA: ICD-10-CM

## 2023-11-01 PROBLEM — R51.9 HEADACHE: Status: RESOLVED | Noted: 2023-02-10 | Resolved: 2023-11-01

## 2023-11-01 PROBLEM — L29.9 ITCHY SKIN: Status: RESOLVED | Noted: 2023-02-10 | Resolved: 2023-11-01

## 2023-11-01 PROCEDURE — 99214 OFFICE O/P EST MOD 30 MIN: CPT | Performed by: FAMILY MEDICINE

## 2023-11-01 RX ORDER — FLUTICASONE PROPIONATE 50 MCG
1 SPRAY, SUSPENSION (ML) NASAL 2 TIMES DAILY
Qty: 16 G | Refills: 3 | Status: SHIPPED | OUTPATIENT
Start: 2023-11-01 | End: 2024-02-20

## 2023-11-01 RX ORDER — METAXALONE 800 MG/1
800 TABLET ORAL 2 TIMES DAILY
Qty: 30 TABLET | Refills: 0 | Status: SHIPPED | OUTPATIENT
Start: 2023-11-01 | End: 2023-11-20 | Stop reason: SDUPTHER

## 2023-11-01 RX ORDER — LORATADINE PSEUDOEPHEDRINE SULFATE 10; 240 MG/1; MG/1
1 TABLET, EXTENDED RELEASE ORAL DAILY
Qty: 30 TABLET | Refills: 1 | Status: SHIPPED | OUTPATIENT
Start: 2023-11-01 | End: 2023-12-05 | Stop reason: SDUPTHER

## 2023-11-01 RX ORDER — METHOCARBAMOL 500 MG/1
500 TABLET, FILM COATED ORAL NIGHTLY
Qty: 30 TABLET | Refills: 1 | Status: SHIPPED | OUTPATIENT
Start: 2023-11-01 | End: 2023-12-21 | Stop reason: SDUPTHER

## 2023-11-01 RX ORDER — KETOROLAC TROMETHAMINE 10 MG/1
10 TABLET, FILM COATED ORAL EVERY 6 HOURS PRN
Qty: 20 TABLET | Refills: 0 | Status: SHIPPED | OUTPATIENT
Start: 2023-11-01 | End: 2023-11-06

## 2023-11-01 RX ORDER — PROCHLORPERAZINE MALEATE 10 MG
10 TABLET ORAL 3 TIMES DAILY PRN
Qty: 20 TABLET | Refills: 0 | Status: SHIPPED | OUTPATIENT
Start: 2023-11-01 | End: 2023-12-31
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
## 2023-11-07 ENCOUNTER — EVALUATION (OUTPATIENT)
Dept: PHYSICAL THERAPY | Facility: CLINIC | Age: 43
End: 2023-11-07
Payer: MEDICAID

## 2023-11-07 DIAGNOSIS — S39.012S STRAIN OF LUMBAR REGION, SEQUELA: ICD-10-CM

## 2023-11-07 DIAGNOSIS — M54.2 NECK PAIN: ICD-10-CM

## 2023-11-07 DIAGNOSIS — S16.1XXS STRAIN OF NECK MUSCLE, SEQUELA: ICD-10-CM

## 2023-11-07 DIAGNOSIS — M62.838 CERVICAL PARASPINAL MUSCLE SPASM: ICD-10-CM

## 2023-11-07 PROBLEM — S39.012A STRAIN OF LUMBAR REGION: Status: ACTIVE | Noted: 2023-11-07

## 2023-11-07 PROBLEM — S16.1XXA CERVICAL STRAIN: Status: ACTIVE | Noted: 2023-11-07

## 2023-11-07 PROCEDURE — 97162 PT EVAL MOD COMPLEX 30 MIN: CPT | Mod: GP | Performed by: PHYSICAL THERAPIST

## 2023-11-07 PROCEDURE — 97535 SELF CARE MNGMENT TRAINING: CPT | Mod: GP | Performed by: PHYSICAL THERAPIST

## 2023-11-07 PROCEDURE — 97110 THERAPEUTIC EXERCISES: CPT | Mod: GP | Performed by: PHYSICAL THERAPIST

## 2023-11-07 ASSESSMENT — PATIENT HEALTH QUESTIONNAIRE - PHQ9
SUM OF ALL RESPONSES TO PHQ9 QUESTIONS 1 AND 2: 2
10. IF YOU CHECKED OFF ANY PROBLEMS, HOW DIFFICULT HAVE THESE PROBLEMS MADE IT FOR YOU TO DO YOUR WORK, TAKE CARE OF THINGS AT HOME, OR GET ALONG WITH OTHER PEOPLE: SOMEWHAT DIFFICULT
2. FEELING DOWN, DEPRESSED OR HOPELESS: SEVERAL DAYS
1. LITTLE INTEREST OR PLEASURE IN DOING THINGS: SEVERAL DAYS
SUM OF ALL RESPONSES TO PHQ9 QUESTIONS 1 AND 2: 1
2. FEELING DOWN, DEPRESSED OR HOPELESS: NOT AT ALL
1. LITTLE INTEREST OR PLEASURE IN DOING THINGS: SEVERAL DAYS
10. IF YOU CHECKED OFF ANY PROBLEMS, HOW DIFFICULT HAVE THESE PROBLEMS MADE IT FOR YOU TO DO YOUR WORK, TAKE CARE OF THINGS AT HOME, OR GET ALONG WITH OTHER PEOPLE: EXTREMELY DIFFICULT

## 2023-11-07 NOTE — PROGRESS NOTES
Physical Therapy      Cervical AROM (* indicates pain)  Flex:   Ext:   SB: L ; R  Rotation: L ; R    Distraction:  Spurlings:  ULTT Median:    Shoulder PROM (* indicates pain)  Flexion: L ; R  ABD: L ; R  ER: L ; R  IR: L : R    Shoulder AROM (* indicates pain)  Flexion: L ; R  ABD: L ; R  Functional ER: L ; R  Functional IR: L : R    Shoulder MMT  (* indicates pain)  Flex: L ; R  ABD: L ; R  IR: L ; R  ER: L ; R  Rhomboids: L ; R  Mid Trap: L ; R  Lower Trap: L ; R    Palpation:

## 2023-11-07 NOTE — Clinical Note
November 7, 2023     Patient: Brii Reeves   YOB: 1980   Date of Visit: 11/7/2023       To Whom it May Concern:    Brii Reeves was seen in my clinic on 11/7/2023. She {Return to school/sport:[id number]}.    If you have any questions or concerns, please don't hesitate to call.         Sincerely,          Laura Norris, PT        CC: No Recipients
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
## 2023-11-07 NOTE — Clinical Note
November 7, 2023     Patient: Brii Reeves   YOB: 1980   Date of Visit: 11/7/2023       To Whom It May Concern:    It is my medical opinion that Brii Reeves {Work release (duty restriction):38963}.    If you have any questions or concerns, please don't hesitate to call.         Sincerely,        Laura Norris, PT    CC: No Recipients

## 2023-11-14 ENCOUNTER — TELEPHONE (OUTPATIENT)
Dept: PHYSICAL THERAPY | Facility: CLINIC | Age: 43
End: 2023-11-14
Payer: MEDICAID

## 2023-11-17 ENCOUNTER — TREATMENT (OUTPATIENT)
Dept: PHYSICAL THERAPY | Facility: CLINIC | Age: 43
End: 2023-11-17
Payer: MEDICAID

## 2023-11-17 DIAGNOSIS — S39.012S STRAIN OF LUMBAR REGION, SEQUELA: ICD-10-CM

## 2023-11-17 DIAGNOSIS — S16.1XXS STRAIN OF NECK MUSCLE, SEQUELA: ICD-10-CM

## 2023-11-17 DIAGNOSIS — M62.838 CERVICAL PARASPINAL MUSCLE SPASM: ICD-10-CM

## 2023-11-17 DIAGNOSIS — M54.2 NECK PAIN: ICD-10-CM

## 2023-11-17 PROCEDURE — 97110 THERAPEUTIC EXERCISES: CPT | Mod: GP | Performed by: PHYSICAL THERAPIST

## 2023-11-17 PROCEDURE — 97140 MANUAL THERAPY 1/> REGIONS: CPT | Mod: GP | Performed by: PHYSICAL THERAPIST

## 2023-11-17 NOTE — PROGRESS NOTES
Physical Therapy Treatment    Patient Name: Brii Reeves  MRN: [id number]  Today's Date: 11/17/2023       Current Problem  1. Cervical paraspinal muscle spasm  Follow Up In Physical Therapy      2. Strain of neck muscle, sequela  Follow Up In Physical Therapy      3. Neck pain  Follow Up In Physical Therapy      4. Strain of lumbar region, sequela  Follow Up In Physical Therapy          Insurance   Payer: [place]  Visit Number: 2/8  Approved Visits: DIONE  TRADITIONAL/ 30V  PT OT COPAY 25 COVERAGE 85 OOP 1600(0) 3200(0)  NO AUTH REQ REF# CLARENCE P [id number]/ALL [facility] COMMUNITY APPROVED 8  PT  VISITS   11-10-23 THRU 12-15-23  AUTH # [id number]    Date Range: 11/10-12/15/23 8 visits      Subjective : pt reports feeling fair, pain still constant, fluctuates in intensity, pt reports some HA, tinnitus, fogginess and midback pain persist      Pain:  4/10 HA 2/10 fogginess 0/10 jaw pain 2/10 tinnitus (L>R)        Objective :  AROM c-spine:   Retract: mod-major  Ext: mod-major  L rotation: mod-major  R rotation: Mod  S/B R/L:mod-major    Treatments:  There Ex:   Supine repeated retracts 2 towels 10x5, recheck of motion between each set    Manual: STM to posterior SO area with TPR to occipital triangle to L, SO MFR release, TPR to Bilateral SCMs    Assessment:     Pt continue to demo dec AROM in cervical spine with intermittent tinnitus, HA and lightheadedness, pt complaint with HEP however limited in time to perform d/t busy work schedule. Pt does demo improved     Plan:     Continue with focus on AROM of cervical spine

## 2023-11-20 DIAGNOSIS — M62.838 CERVICAL PARASPINAL MUSCLE SPASM: ICD-10-CM

## 2023-11-20 DIAGNOSIS — S16.1XXS STRAIN OF NECK MUSCLE, SEQUELA: ICD-10-CM

## 2023-11-20 DIAGNOSIS — S39.012S STRAIN OF LUMBAR REGION, SEQUELA: ICD-10-CM

## 2023-11-20 RX ORDER — METAXALONE 800 MG/1
800 TABLET ORAL 2 TIMES DAILY
Qty: 30 TABLET | Refills: 0 | Status: SHIPPED | OUTPATIENT
Start: 2023-11-20 | End: 2023-12-21 | Stop reason: SDUPTHER

## 2023-11-20 RX ORDER — KETOROLAC TROMETHAMINE 10 MG/1
10 TABLET, FILM COATED ORAL 4 TIMES DAILY PRN
Qty: 20 TABLET | Refills: 0 | Status: SHIPPED | OUTPATIENT
Start: 2023-11-20 | End: 2023-11-25

## 2023-11-20 NOTE — TELEPHONE ENCOUNTER
Dr. Bravo pt  Refill on:    metaxalone (Skelaxin) 800 mg tablet   ketorolac (Toradol) 10 mg tablet [1   Pharmacy    [facility] DR[facility] #[id number] [facility] [address] AT [facility] OF [facility] 11/01/23

## 2023-11-28 ENCOUNTER — TREATMENT (OUTPATIENT)
Dept: PHYSICAL THERAPY | Facility: CLINIC | Age: 43
End: 2023-11-28
Payer: MEDICAID

## 2023-11-28 DIAGNOSIS — S16.1XXS STRAIN OF NECK MUSCLE, SEQUELA: ICD-10-CM

## 2023-11-28 DIAGNOSIS — M54.2 NECK PAIN: ICD-10-CM

## 2023-11-28 DIAGNOSIS — S39.012S STRAIN OF LUMBAR REGION, SEQUELA: ICD-10-CM

## 2023-11-28 DIAGNOSIS — M62.838 CERVICAL PARASPINAL MUSCLE SPASM: Primary | ICD-10-CM

## 2023-11-28 PROCEDURE — 97140 MANUAL THERAPY 1/> REGIONS: CPT | Mod: GP | Performed by: PHYSICAL THERAPIST

## 2023-11-28 PROCEDURE — 97110 THERAPEUTIC EXERCISES: CPT | Mod: GP | Performed by: PHYSICAL THERAPIST

## 2023-11-28 NOTE — PROGRESS NOTES
Physical Therapy Treatment    Patient Name: Brii Reeves  MRN: [id number]  Today's Date: 11/17/2023       Current Problem  1. Cervical paraspinal muscle spasm  Follow Up In Physical Therapy      2. Strain of neck muscle, sequela  Follow Up In Physical Therapy      3. Neck pain  Follow Up In Physical Therapy      4. Strain of lumbar region, sequela  Follow Up In Physical Therapy          Insurance   Payer: [place]  Visit Number: 3/8  Approved Visits: DIONE  TRADITIONAL/ 30V  PT OT COPAY 25 COVERAGE 85 OOP 1600(0) 3200(0)  NO AUTH REQ REF# CLARENCE P [id number]/ALL [facility] COMMUNITY APPROVED 8  PT  VISITS   11-10-23 THRU 12-15-23  AUTH # [id number]    Date Range: 11/10-12/15/23 8 visits      Subjective : Pt reports HA, dizziness, fatigue and mental fog persist. Pt also reporting new onset of L ankle pain with DF/PF this date. Pt reports busy work weekend and having to ride in backseat of pickup truck which likely contributed to increased pain this date.       Pain:  Pt reports neck pain 4/10, HA 2/10, dizziness 2/10 this date        Objective :  AROM c-spine:   Retract: mod-major  Ext: mod-major  L rotation: mod  R rotation: Mod  S/B R/L:mod    Treatments:  There Ex:   Supine repeated retracts 2 towels 10x5, recheck of motion between each set  **added t-spine ext over chair this date 2x10  **added seated unweighted arms crossed rep retracts 2x10    Manual: STM to posterior SO area with TPR to occipital triangle to L, SO MFR release, TPR to Bilateral SCMs    Assessment: pt reports increased ankle pain this date, assessed and likely due to some soft tissue restriction from previous ankle injury, added self DF mobs with step/chair at home. Assessed AROM of c spine, pt demo's gains in AROM, remains most restricted with retract/ext/rotation, especially with pain to the L. Perform there ex as noted, follow with manual to improve soft tissue extensibility. Pt does demo improved AROM of cervical spine after  interventions this date, especially seated rep retracts w/manual shrugging to reduce oversue of UT while performing, able to perform rep retract with improved ease.          Plan:     Continue with focus on AROM of cervical spine and reduction of spasming in UT/levator, encouraged pt to consider talking to counselor as she as teary this date and expressed feeling overwhelmed, consoled pt that these symptoms are normal post concussion and we will continue to monitor. Also introduced dry needling as potential intervention in the future to faciliatet spasm reduction in neck.

## 2023-11-30 ENCOUNTER — TRANSCRIBE ORDERS (OUTPATIENT)
Dept: PHYSICAL THERAPY | Facility: CLINIC | Age: 43
End: 2023-11-30
Payer: MEDICAID

## 2023-11-30 ENCOUNTER — TREATMENT (OUTPATIENT)
Dept: PHYSICAL THERAPY | Facility: CLINIC | Age: 43
End: 2023-11-30
Payer: MEDICAID

## 2023-11-30 DIAGNOSIS — M62.838 CERVICAL PARASPINAL MUSCLE SPASM: ICD-10-CM

## 2023-11-30 DIAGNOSIS — M54.2 NECK PAIN: Primary | ICD-10-CM

## 2023-11-30 DIAGNOSIS — S39.012S STRAIN OF LUMBAR REGION, SEQUELA: ICD-10-CM

## 2023-11-30 DIAGNOSIS — S16.1XXS STRAIN OF NECK MUSCLE, SEQUELA: ICD-10-CM

## 2023-11-30 DIAGNOSIS — R47.01 APHASIA: Primary | ICD-10-CM

## 2023-11-30 PROCEDURE — 97110 THERAPEUTIC EXERCISES: CPT | Mod: GP | Performed by: PHYSICAL THERAPIST

## 2023-11-30 PROCEDURE — 97140 MANUAL THERAPY 1/> REGIONS: CPT | Mod: GP | Performed by: PHYSICAL THERAPIST

## 2023-12-04 ENCOUNTER — APPOINTMENT (OUTPATIENT)
Dept: PHYSICAL THERAPY | Facility: CLINIC | Age: 43
End: 2023-12-04
Payer: MEDICAID

## 2023-12-04 ENCOUNTER — TELEPHONE (OUTPATIENT)
Dept: PRIMARY CARE | Facility: CLINIC | Age: 43
End: 2023-12-04
Payer: MEDICAID

## 2023-12-04 DIAGNOSIS — J01.10 ACUTE FRONTAL SINUSITIS, RECURRENCE NOT SPECIFIED: ICD-10-CM

## 2023-12-04 NOTE — TELEPHONE ENCOUNTER
Dr. Mcdonald patient  Refill for loratadine-pseudoephedrine (Claritin-D 24 Hour)  mg 24 hr tablet  [facility] in [facility]

## 2023-12-05 ENCOUNTER — APPOINTMENT (OUTPATIENT)
Dept: PHYSICAL THERAPY | Facility: CLINIC | Age: 43
End: 2023-12-05
Payer: MEDICAID

## 2023-12-05 ENCOUNTER — TREATMENT (OUTPATIENT)
Dept: PHYSICAL THERAPY | Facility: CLINIC | Age: 43
End: 2023-12-05
Payer: MEDICAID

## 2023-12-05 DIAGNOSIS — S16.1XXD STRAIN OF NECK MUSCLE, SUBSEQUENT ENCOUNTER: ICD-10-CM

## 2023-12-05 DIAGNOSIS — M54.2 NECK PAIN: Primary | ICD-10-CM

## 2023-12-05 PROCEDURE — 97140 MANUAL THERAPY 1/> REGIONS: CPT | Mod: GP | Performed by: PHYSICAL THERAPIST

## 2023-12-05 PROCEDURE — 97110 THERAPEUTIC EXERCISES: CPT | Mod: GP | Performed by: PHYSICAL THERAPIST

## 2023-12-05 NOTE — PROGRESS NOTES
Physical Therapy Treatment    Patient Name: Brii Reeves  MRN: [id number]  Today's Date: 11/17/2023       Current Problem  1. Cervical paraspinal muscle spasm  Follow Up In Physical Therapy      2. Strain of neck muscle, sequela  Follow Up In Physical Therapy      3. Neck pain  Follow Up In Physical Therapy      4. Strain of lumbar region, sequela  Follow Up In Physical Therapy          Insurance   Payer: [place]  Visit Number: 5/8  Approved Visits: DIONE  TRADITIONAL/ 30V  PT OT COPAY 25 COVERAGE 85 OOP 1600(0) 3200(0)  NO AUTH REQ REF# CLARENCE P [id number]/ALL [facility] COMMUNITY APPROVED 8  PT  VISITS   11-10-23 THRU 12-15-23  AUTH # [id number]    Date Range: 11/10-12/15/23 8 visits      Subjective : Pt reports neck pain and HA remain, however moving better, fog remains about the same      Pain:  Pt reports neck pain 4/10, HA 2/10, dizziness 0/10 this date        Objective :  AROM c-spine:   Retract: mod-major  Ext: mod-major  L rotation: mod  R rotation: min  S/B R/L:mod    Treatments:  Dry needling:  performed this date to area  thoracic paraspinals, L Suboccpiput and TPDN L upper trap. Total of 10 needles inserted (8-.25x30 t-spine, 1-.25x30 subossiput ,1-.25x30 L upper trap), fascial winding technique performed t-spine/subocciput, conning L upper trap. all needles removed, area inspected for adverse symptoms, none noted.     Ther ex:   Rep retract sitting 2x10  T-spine ext over chair 2x10    Re-assessed AROM    Manual:  Rep retract w/PT OP 2x10        Assessment: pt reports less pain this date but brain fog and HA persists. Pt reports she feels tight in lower neck and upper mid back.  patient educated in post- dry needling management and expected symptoms from treatment. all patient questions answered.    Plan:     Continue with focus on AROM of cervical spine, will slowly introduce cardiovascular exercise for concussion/HA.

## 2023-12-07 ENCOUNTER — TREATMENT (OUTPATIENT)
Dept: PHYSICAL THERAPY | Facility: CLINIC | Age: 43
End: 2023-12-07
Payer: MEDICAID

## 2023-12-07 DIAGNOSIS — M54.2 NECK PAIN: Primary | ICD-10-CM

## 2023-12-07 DIAGNOSIS — S16.1XXD STRAIN OF NECK MUSCLE, SUBSEQUENT ENCOUNTER: ICD-10-CM

## 2023-12-07 PROCEDURE — 97140 MANUAL THERAPY 1/> REGIONS: CPT | Mod: GP | Performed by: PHYSICAL THERAPIST

## 2023-12-07 PROCEDURE — 97113 AQUATIC THERAPY/EXERCISES: CPT | Mod: GP | Performed by: PHYSICAL THERAPIST

## 2023-12-07 NOTE — PROGRESS NOTES
"Physical Therapy Treatment    Patient Name: Brii Reeves  MRN: [id number]  Today's Date: 11/17/2023       Current Problem  1. Cervical paraspinal muscle spasm  Follow Up In Physical Therapy      2. Strain of neck muscle, sequela  Follow Up In Physical Therapy      3. Neck pain  Follow Up In Physical Therapy      4. Strain of lumbar region, sequela  Follow Up In Physical Therapy          Insurance   Payer: [place]  Visit Number: 5/8  [facility]  PT OT COPAY 25 COVERAGE 85 OOP 1600(0) 3200(0)  NO AUTH REQ REF# CLARENCE P [id number]/ALL [facility] COMMUNITY APPROVED 8  PT  VISITS   11-10-23 THRU 12-15-23  AUTH # [id number]    Date Range: 11/10-12/15/23 8 visits      Subjective : Pt reports neck pain and HA remain, however moving better, fog remains about the same      Pain:  Pt reports neck pain 4/10, HA 2/10, dizziness 0/10 this date        Objective :  AROM c-spine:   Retract: mod  Ext: mod  L rotation: min-mod  R rotation: min  S/B R/L:mod    Treatments:  Dry needling:  L upper trap TPDN 0.25x30 mm x2    Ther ex:   T-spine ext over chair 23x10    Manual:  Rep retracts w/PT OP 3x10  STM to L sided neck    Neuro Re-Ed:   Seated plain background X1 30\" x5    Assessment: pt reports HA and midback pain persist, cervical AROM continues to improve, added X1 VOR re-ed this date due to c/o blurred vision and difficulty focusing, pt challenged with this while in clinic, added to HEP, will continue to progress per tolerance.     Plan:     Continue with focus on AROM of cervical spine, progress Nuero Re-ed for VOR          "

## 2023-12-09 RX ORDER — LORATADINE PSEUDOEPHEDRINE SULFATE 10; 240 MG/1; MG/1
1 TABLET, EXTENDED RELEASE ORAL DAILY
Qty: 30 TABLET | Refills: 0 | Status: SHIPPED | OUTPATIENT
Start: 2023-12-09 | End: 2024-02-20

## 2023-12-12 ENCOUNTER — TREATMENT (OUTPATIENT)
Dept: PHYSICAL THERAPY | Facility: CLINIC | Age: 43
End: 2023-12-12
Payer: MEDICAID

## 2023-12-12 ENCOUNTER — EVALUATION (OUTPATIENT)
Dept: SPEECH THERAPY | Facility: CLINIC | Age: 43
End: 2023-12-12
Payer: MEDICAID

## 2023-12-12 DIAGNOSIS — R41.89 COGNITIVE DEFICITS: Primary | ICD-10-CM

## 2023-12-12 DIAGNOSIS — M54.2 NECK PAIN: ICD-10-CM

## 2023-12-12 DIAGNOSIS — S16.1XXD STRAIN OF NECK MUSCLE, SUBSEQUENT ENCOUNTER: Primary | ICD-10-CM

## 2023-12-12 PROCEDURE — 97140 MANUAL THERAPY 1/> REGIONS: CPT | Mod: GP | Performed by: PHYSICAL THERAPIST

## 2023-12-12 PROCEDURE — 97110 THERAPEUTIC EXERCISES: CPT | Mod: GP | Performed by: PHYSICAL THERAPIST

## 2023-12-12 PROCEDURE — 92523 SPEECH SOUND LANG COMPREHEN: CPT | Mod: GN

## 2023-12-12 NOTE — PROGRESS NOTES
Physical Therapy Treatment/Re-evaluation    Patient Name: Brii Reeves  MRN: [id number]  Today's Date: 11/17/2023       Current Problem  1. Cervical paraspinal muscle spasm  Follow Up In Physical Therapy      2. Strain of neck muscle, sequela  Follow Up In Physical Therapy      3. Neck pain  Follow Up In Physical Therapy      4. Strain of lumbar region, sequela  Follow Up In Physical Therapy          Insurance   Payer: [place]  Visit Number: 6/8  [facility]  PT OT COPAY 25 COVERAGE 85 OOP 1600(0) 3200(0)  NO AUTH REQ REF# CLARENCE P [id number]/ALL [facility] COMMUNITY APPROVED 8  PT  VISITS   11-10-23 THRU 12-15-23  AUTH # [id number]    Date Range: 11/10-12/15/23 8 visits      Subjective : Pt reports neck pain and HA remain, however moving better, fog remains about the same      Pain:  Pt reports neck pain 4/10, HA 4/10, dizziness 2/10 this date        Objective :  AROM c-spine:   Retract: mod  Ext: mod  L rotation: min-mod  R rotation: min  S/B R/L:mod    Treatments:  Ther ex:  Supine repeated retracts on 2 towels 4x10  OA nods on 2 towels 3x10    Manual:  SO release/MFR  STM posterior neck/scalene bilaterally  L 1st rib mob grade II-III  TPR SCMs    Assessment: pt reports increased pain this date form working long hours this past weekend, increased HA and dizziness as well this date. AROM cervical spine remains the same this date from previous visit. Pt responds well to manual this date, reports WINTER remains 4/10 upon departure, neck pain 3/10 after manual.  Pt encouraged ot use ice at work on neck when she can, hydrate and take frequent rest breaks. Pt receptive to info. Re-evaluation this date after 6 vists of PT, pt to benefit from addt'l PT to continue working on goals, pt continue to demo decreased AROM c-spine, vestibular hypofunction and scapular stabilizing strength to allow pt to perform home/work related activities at max level of functional ability.       Goals:  1) pt  demo independence w/advanced HEP- demo-ing  2) pt demo AROM of cervical spine min-nil deficits to perform all functional mobility for job/home tasks- progressing  3) pt demo good posture in sit/stand to reduce pain in cervical spine- progressing  4) pt reports central neck pain </=2/10 at all times to perform all functional mobility for home/job tasks- progressing  5) Increased Saulo UE strength 1/2-1 muscle grade to achieve max functional strength to stabilize spine-unable to perform due to continued pain  6) Decrease NDI score to < or equal to 20% for evidence of improved function - 48% today NDI      Plan:     Continue with focus on AROM of cervical spine, progression Neuro Re-ed, pain reduction and scap stabilizer strengthening.
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
## 2023-12-14 ENCOUNTER — TREATMENT (OUTPATIENT)
Dept: PHYSICAL THERAPY | Facility: CLINIC | Age: 43
End: 2023-12-14
Payer: MEDICAID

## 2023-12-14 DIAGNOSIS — M62.838 CERVICAL PARASPINAL MUSCLE SPASM: Primary | ICD-10-CM

## 2023-12-14 DIAGNOSIS — S16.1XXS STRAIN OF NECK MUSCLE, SEQUELA: ICD-10-CM

## 2023-12-14 PROBLEM — R41.89 COGNITIVE DEFICITS: Status: ACTIVE | Noted: 2023-12-14

## 2023-12-14 PROCEDURE — 97110 THERAPEUTIC EXERCISES: CPT | Mod: GP | Performed by: PHYSICAL THERAPIST

## 2023-12-14 PROCEDURE — 97140 MANUAL THERAPY 1/> REGIONS: CPT | Mod: GP | Performed by: PHYSICAL THERAPIST

## 2023-12-14 NOTE — PROGRESS NOTES
Physical Therapy Treatment    Patient Name: Brii Reeves  MRN: [id number]  Today's Date: 11/17/2023       Current Problem  1. Cervical paraspinal muscle spasm  Follow Up In Physical Therapy      2. Strain of neck muscle, sequela  Follow Up In Physical Therapy      3. Neck pain  Follow Up In Physical Therapy      4. Strain of lumbar region, sequela  Follow Up In Physical Therapy          Insurance   Payer: [place]  Visit Number: 8/8  Approved Visits: DIONE  TRADITIONAL/ 30V  PT OT COPAY 25 COVERAGE 85 OOP 1600(0) 3200(0)  NO AUTH REQ REF# CLARENCE P [id number]/ALL [facility] COMMUNITY APPROVED 8  PT  VISITS   11-10-23 THRU 12-15-23  AUTH # [id number]    Date Range: 12/12-1/12/24 16 visits      Subjective : Pt reports neck pain and HA remain, however moving better, fog remains about the same      Pain:  Pt reports neck pain 2/10, HA 0/10, dizziness 0/10 this date        Objective :  AROM c-spine:   Retract: min-mod  Ext: min-mod  L rotation: min-mod  R rotation:nil   S/B R/L:mod  AROM t-spine:  ext: mod    Saulo shoulder scap stabilizers 4/5 grossly    *blurry visiotn persist with seated x1 on plain wall*      Treatments:  Ther ex:  Supine repeated retracts on 1 towels 4x10  OA nods on 1 towels 3x10  UBE retro: 4'   Supine t-spine opening exercises: HABD, Saulo UE flex, Saulo ER x15 ea    Manual:  SO release/MFR  STM posterior neck/scalene bilaterally  TPR SCMs    Assessment:   Pt reports feeling better this date, added retro UBE to assess HA with concussion, pt reports mild inc in HA from 2/10 to 3/10, returns to baseline after UBE. Added t-spine ex's this date to faciliate T-spine extension and opening anterior chest, pt tolerates well.     Pt has participated in 8 visits of PT and presents this date for re-evaluation, pt has made gains in AROM of cervical spine and pain reduction however continues to demo significant restrictions in both cervical and throacic spine limiting her ability to perform job and home tasks.  Pt also limited with vestibular hypofunction consistent with concussion and would benefit from continued PT to address deficits with VOR and cardiovascular exercise/HA.      Goals:  1) pt demo independence w/advanced HEP- progressing  2) pt demo AROM of cervical spine min-nil deficits to perform all functional mobility for job/home tasks- progressing  3) pt demo good posture in sit/stand to reduce pain in cervical spine- progressing  4) pt reports central neck pain </=2/10 at all times to perform all functional mobility for home/job tasks- progressing  5) Increased Saulo UE strength 1/2-1 muscle grade to achieve max functional strength to stabilize spine-unable to perform due to continued pain  6) Decrease NDI score to < or equal to 20% for evidence of improved function - 48% today       Plan:     Continue with focus on AROM of cervical/thoracic spine, progression Neuro Re-ed, pain reduction and scap stabilizer strengthening.

## 2023-12-19 ENCOUNTER — APPOINTMENT (OUTPATIENT)
Dept: PHYSICAL THERAPY | Facility: CLINIC | Age: 43
End: 2023-12-19
Payer: MEDICAID

## 2023-12-20 ENCOUNTER — TELEPHONE (OUTPATIENT)
Dept: PRIMARY CARE | Facility: CLINIC | Age: 43
End: 2023-12-20
Payer: MEDICAID

## 2023-12-20 DIAGNOSIS — S16.1XXS STRAIN OF NECK MUSCLE, SEQUELA: ICD-10-CM

## 2023-12-20 DIAGNOSIS — S39.012S STRAIN OF LUMBAR REGION, SEQUELA: ICD-10-CM

## 2023-12-20 DIAGNOSIS — M62.838 CERVICAL PARASPINAL MUSCLE SPASM: ICD-10-CM

## 2023-12-20 NOTE — TELEPHONE ENCOUNTER
Dr calvo pt  Refill on metaxalone (Skelaxin) 800 mg tablet   methocarbamol (Robaxin) 500 mg   ketorolac (Toradol) 10 mg tablet     Pharmacy    [facility] #[id number] [facility] [address] AT [facility] OF [facility]

## 2023-12-21 RX ORDER — METHOCARBAMOL 500 MG/1
500 TABLET, FILM COATED ORAL NIGHTLY
Qty: 30 TABLET | Refills: 1 | Status: SHIPPED | OUTPATIENT
Start: 2023-12-21 | End: 2024-02-09 | Stop reason: SDUPTHER

## 2023-12-21 RX ORDER — METAXALONE 800 MG/1
800 TABLET ORAL 2 TIMES DAILY
Qty: 30 TABLET | Refills: 0 | Status: SHIPPED | OUTPATIENT
Start: 2023-12-21 | End: 2024-01-12 | Stop reason: SDUPTHER

## 2023-12-21 RX ORDER — KETOROLAC TROMETHAMINE 10 MG/1
10 TABLET, FILM COATED ORAL EVERY 6 HOURS PRN
Qty: 20 TABLET | Refills: 0 | Status: SHIPPED | OUTPATIENT
Start: 2023-12-21 | End: 2023-12-26

## 2023-12-22 ENCOUNTER — APPOINTMENT (OUTPATIENT)
Dept: PHYSICAL THERAPY | Facility: CLINIC | Age: 43
End: 2023-12-22
Payer: MEDICAID

## 2024-01-02 ENCOUNTER — TREATMENT (OUTPATIENT)
Dept: PHYSICAL THERAPY | Facility: CLINIC | Age: 44
End: 2024-01-02
Payer: MEDICAID

## 2024-01-02 DIAGNOSIS — M54.2 NECK PAIN: Primary | ICD-10-CM

## 2024-01-02 DIAGNOSIS — S16.1XXD STRAIN OF NECK MUSCLE, SUBSEQUENT ENCOUNTER: ICD-10-CM

## 2024-01-02 NOTE — PROGRESS NOTES
Physical Therapy Treatment    Patient Name: Brii Reeves  MRN: [id number]  Today's Date: 11/17/2023       Current Problem  1. Cervical paraspinal muscle spasm  Follow Up In Physical Therapy      2. Strain of neck muscle, sequela  Follow Up In Physical Therapy      3. Neck pain  Follow Up In Physical Therapy      4. Strain of lumbar region, sequela  Follow Up In Physical Therapy          Insurance   Payer: [place]  Visit Number: 9/16  [facility]  PT OT COPAY 25 COVERAGE 85 OOP 1600(0) 3200(0)  NO AUTH REQ REF# CLARENCE P [id number]/ALL [facility] COMMUNITY APPROVED 8  PT  VISITS   11-10-23 THRU 12-15-23  AUTH # [id number]    Date Range: 12/12-1/12/24 16 visits      Subjective : Pt reports neck pain and HA remain, hINCREASED MIDBACK AND NECK SPASMING THIS DATE      Pain:  Pt reports neck pain 2/10, HA 2/10, dizziness 0/10 this date        Objective :  AROM c-spine:   Retract: min-mod  Ext: min-mod  L rotation: min-mod  R rotation:nil   S/B R/L:mod  AROM t-spine:  ext: mod    Saulo shoulder scap stabilizers 4/5 grossly          Treatments:  Ther ex:  REP RETRACT IN SITTING IN CHAIR 3x10  T spine rotation in 4-point x10 bilaterally *more difficult on L*    Manual:  TPDN to L paraspinals in prone 8-0.25x30 mm needles        Assessment:   Pt reports HA about the same this date but reports increased paraspinal spasming, started with repeated retractions for mobility and warm up, followed with t-spine mobility, pt greatly restricted with this. Follow with Dry needling performed this date to area L sided parspinals  8 0.25-x30 mm needles inserted,        technique performed. all needles removed, area inspected for adverse symptoms, none noted, patient educated in post- dry needling management and expected symptoms from treatment. all patient questions answered.         Plan:     Continue with focus on AROM of cervical/thoracic spine, progression Neuro Re-ed, pain reduction and scap stabilizer  strengthening.

## 2024-01-04 ENCOUNTER — TREATMENT (OUTPATIENT)
Dept: PHYSICAL THERAPY | Facility: CLINIC | Age: 44
End: 2024-01-04
Payer: MEDICAID

## 2024-01-04 DIAGNOSIS — S16.1XXS STRAIN OF NECK MUSCLE, SEQUELA: Primary | ICD-10-CM

## 2024-01-04 DIAGNOSIS — M62.838 CERVICAL PARASPINAL MUSCLE SPASM: ICD-10-CM

## 2024-01-04 PROCEDURE — 97110 THERAPEUTIC EXERCISES: CPT | Mod: GP | Performed by: PHYSICAL THERAPIST

## 2024-01-04 NOTE — PROGRESS NOTES
Physical Therapy Treatment    Patient Name: Brii Reeves  MRN: [id number]  Today's Date: 1/4/24       Current Problem  1. Cervical paraspinal muscle spasm  Follow Up In Physical Therapy      2. Strain of neck muscle, sequela  Follow Up In Physical Therapy      3. Neck pain  Follow Up In Physical Therapy      4. Strain of lumbar region, sequela  Follow Up In Physical Therapy          Insurance   Payer: [place]  Visit Number: 9/16  [facility]  PT OT COPAY 25 COVERAGE 85 OOP 1600(0) 3200(0)  NO AUTH REQ REF# CLARENCE P [id number]/ALL [facility] COMMUNITY APPROVED 8  PT  VISITS   11-10-23 THRU 12-15-23  AUTH # [id number]    Date Range: 12/12-1/12/24 16 visits      Subjective : Pt reports neck pain/midback pain persist      Pain:  Pt reports neck pain 3/10, HA 2/10, dizziness 0/10 this date        Objective :  AROM c-spine:   Retract: min-mod  Ext: min-mod  L rotation: min-mod  R rotation:nil   S/B R/L:mod  AROM t-spine:  ext: mod    Saulo shoulder scap stabilizers 4/5 grossly          Treatments:  Ther ex:  UBE retro 8'  T-spine ext over chair 2x10  Retract/extension seated 2x10  Rows/LAE/triceps pull downs GTB 3x10  Wall push ups x15         Assessment:   Pt reports feeling well with mild HA and posterior neck pain this date, pt able to complete exercises without increased pain, does report some soreness while performing and frustrations due to continued weakness. Pt advised to monitor sx for DOMS, will progress next visit as able.       Plan:     Continue with focus on AROM of cervical/thoracic spine, progression Neuro Re-ed, pain reduction and scap stabilizer strengthening.

## 2024-01-09 ENCOUNTER — ANCILLARY PROCEDURE (OUTPATIENT)
Dept: RADIOLOGY | Facility: CLINIC | Age: 44
End: 2024-01-09
Payer: MEDICAID

## 2024-01-09 ENCOUNTER — OFFICE VISIT (OUTPATIENT)
Dept: PRIMARY CARE | Facility: CLINIC | Age: 44
End: 2024-01-09
Payer: MEDICAID

## 2024-01-09 ENCOUNTER — APPOINTMENT (OUTPATIENT)
Dept: PHYSICAL THERAPY | Facility: CLINIC | Age: 44
End: 2024-01-09
Payer: MEDICAID

## 2024-01-09 VITALS
HEIGHT: 65 IN | BODY MASS INDEX: 21.99 KG/M2 | WEIGHT: 132 LBS | TEMPERATURE: 97.2 F | HEART RATE: 77 BPM | OXYGEN SATURATION: 99 % | SYSTOLIC BLOOD PRESSURE: 130 MMHG | DIASTOLIC BLOOD PRESSURE: 70 MMHG

## 2024-01-09 DIAGNOSIS — M79.672 LEFT FOOT PAIN: ICD-10-CM
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
## 2024-01-09 DIAGNOSIS — S39.012S STRAIN OF LUMBAR REGION, SEQUELA: ICD-10-CM

## 2024-01-09 DIAGNOSIS — M62.838 CERVICAL PARASPINAL MUSCLE SPASM: ICD-10-CM

## 2024-01-09 PROCEDURE — 99214 OFFICE O/P EST MOD 30 MIN: CPT | Performed by: FAMILY MEDICINE

## 2024-01-09 PROCEDURE — 73630 X-RAY EXAM OF FOOT: CPT | Mod: LEFT SIDE | Performed by: RADIOLOGY

## 2024-01-09 PROCEDURE — 73630 X-RAY EXAM OF FOOT: CPT | Mod: LT
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
## 2024-01-11 ENCOUNTER — APPOINTMENT (OUTPATIENT)
Dept: PHYSICAL THERAPY | Facility: CLINIC | Age: 44
End: 2024-01-11
Payer: MEDICAID

## 2024-01-12 DIAGNOSIS — S39.012S STRAIN OF LUMBAR REGION, SEQUELA: ICD-10-CM

## 2024-01-12 DIAGNOSIS — M62.838 CERVICAL PARASPINAL MUSCLE SPASM: ICD-10-CM

## 2024-01-12 DIAGNOSIS — S16.1XXS STRAIN OF NECK MUSCLE, SEQUELA: ICD-10-CM

## 2024-01-12 NOTE — TELEPHONE ENCOUNTER
REQUESTS:  metaxalone (Skelaxin) 800 mg tablet     SENT TO:  [facility] IN [place]    **ASKED CHECKOUT TO PUT IN REFILL REQUEST ON THE 9TH, BUT WAS NEVER PUT IN. NEEDS ASAP SO SHE CAN WORK

## 2024-01-13 DIAGNOSIS — M62.838 CERVICAL PARASPINAL MUSCLE SPASM: ICD-10-CM

## 2024-01-13 DIAGNOSIS — S39.012S STRAIN OF LUMBAR REGION, SEQUELA: ICD-10-CM

## 2024-01-13 DIAGNOSIS — S16.1XXS STRAIN OF NECK MUSCLE, SEQUELA: ICD-10-CM

## 2024-01-15 RX ORDER — METAXALONE 800 MG/1
800 TABLET ORAL 2 TIMES DAILY
Qty: 30 TABLET | Refills: 0 | Status: SHIPPED | OUTPATIENT
Start: 2024-01-15 | End: 2024-02-21 | Stop reason: SDUPTHER

## 2024-01-15 RX ORDER — METAXALONE 800 MG/1
TABLET ORAL
Qty: 30 TABLET | Refills: 0 | Status: SHIPPED | OUTPATIENT
Start: 2024-01-15 | End: 2024-02-09 | Stop reason: SDUPTHER

## 2024-01-16 ENCOUNTER — TREATMENT (OUTPATIENT)
Dept: PHYSICAL THERAPY | Facility: CLINIC | Age: 44
End: 2024-01-16
Payer: MEDICAID

## 2024-01-16 DIAGNOSIS — S16.1XXD STRAIN OF NECK MUSCLE, SUBSEQUENT ENCOUNTER: Primary | ICD-10-CM

## 2024-01-16 PROCEDURE — 97140 MANUAL THERAPY 1/> REGIONS: CPT | Mod: GP | Performed by: PHYSICAL THERAPIST

## 2024-01-16 PROCEDURE — 97110 THERAPEUTIC EXERCISES: CPT | Mod: GP | Performed by: PHYSICAL THERAPIST

## 2024-01-16 NOTE — PROGRESS NOTES
Physical Therapy Treatment    Patient Name: Brii Reeves  MRN: [id number]  Today's Date: 1/16/24       Current Problem  1. Cervical paraspinal muscle spasm  Follow Up In Physical Therapy      2. Strain of neck muscle, sequela  Follow Up In Physical Therapy      3. Neck pain  Follow Up In Physical Therapy      4. Strain of lumbar region, sequela  Follow Up In Physical Therapy          Insurance   Payer: [employer]  Visit Number: 10/16  Approved Visits: DIONE  TRADITIONAL/ 30V  PT OT COPAY 25 COVERAGE 85 OOP 1600(0) 3200(0)  NO AUTH REQ REF# CLARENCE P [id number]/ALL [facility] COMMUNITY APPROVED 8  PT  VISITS   11-10-23 THRU 12-15-23  AUTH # [id number]    Date Range: 12/12-1/12/24 16 visits      Subjective : Pt reports neck pain/midback pain persist      Pain:  Pt reports neck pain 3/10, HA 2/10, dizziness 0/10 this date        Objective :  AROM c-spine:   Retract: min-mod  Ext: min-mod  L rotation: min-mod  R rotation:min   S/B R/L:mod  AROM t-spine:  ext: mod    Saulo shoulder scap stabilizers 4/5 grossly          Treatments:  Rep retracts seated 2x10  Rep retracts w/PT OP 2x10  Rep retracts w/pillow case for lower cervical self mobs: 3x10  T-spine ext over chair: 2x10  T-spine a/p mobs seated 3x10         Assessment:   Pt reports continues tightness feeling and continues to demo decreased AROM of C/T spine. Pt has made gains in overall AROM however continues to demo min-moderate restrictions. In addition to decreased AROM pt continues to demo decreased scap stabilizer strength. Will continue to focus on CT junction mobility, added self rep retracts to HEP w/self mob with pillow case.       Plan:     Continue with focus on AROM of cervical/thoracic spine, progression Neuro Re-ed, pain reduction and scap stabilizer strengthening.

## 2024-01-18 ENCOUNTER — TREATMENT (OUTPATIENT)
Dept: PHYSICAL THERAPY | Facility: CLINIC | Age: 44
End: 2024-01-18
Payer: MEDICAID

## 2024-01-18 DIAGNOSIS — S16.1XXD STRAIN OF NECK MUSCLE, SUBSEQUENT ENCOUNTER: Primary | ICD-10-CM

## 2024-01-18 PROCEDURE — 97140 MANUAL THERAPY 1/> REGIONS: CPT | Mod: GP | Performed by: PHYSICAL THERAPIST

## 2024-01-18 PROCEDURE — 97110 THERAPEUTIC EXERCISES: CPT | Mod: GP | Performed by: PHYSICAL THERAPIST

## 2024-01-18 NOTE — PROGRESS NOTES
"Physical Therapy Treatment    Patient Name: Brii Reeves  MRN: [id number]  Today's Date: 1/18/24       Current Problem  1. Cervical paraspinal muscle spasm  Follow Up In Physical Therapy      2. Strain of neck muscle, sequela  Follow Up In Physical Therapy      3. Neck pain  Follow Up In Physical Therapy      4. Strain of lumbar region, sequela  Follow Up In Physical Therapy          Insurance   Payer: [employer]  Visit Number: 11/16  [facility]  PT OT COPAY 25 COVERAGE 85 OOP 1600(0) 3200(0)  NO AUTH REQ REF# CLARENCE P [id number]/ALL [facility] COMMUNITY APPROVED 8  PT  VISITS   11-10-23 THRU 12-15-23  AUTH # [id number]    Date Range: 12/12-1/12/24 16 visits      Subjective : Pt reports less pain this date,       Pain:  Pt reports neck pain/upper back  3/10, HA 2/10, dizziness 0/10 this date        Objective :  AROM c-spine:   Retract: min-mod  Ext: min-mod  L rotation: min-mod  R rotation:min   S/B R: min  S/B L: min  AROM t-spine:  ext: mod    Saulo shoulder scap stabilizers 4/5 grossly          Treatments:  Rep retracts seated 2x10  Rep retracts w/PT OP 2x10  T-spine ext with 1/2 roll in chair 2x10  T-spine ext mob grade II-III: 2x10  1/2 roll supine pec minor stretch 30\" x5  Supine thoracic opening exercsies: 90/90 ER, HABD, bilat flex 3x10           Assessment:   Pt tolerates exercises well today, greatest limitation remains at CT junction, will continue to progress mobility as tolerated.     Plan:     Continue with focus on AROM of cervical/thoracic spine, progression Neuro Re-ed, pain reduction and scap stabilizer strengthening.          "

## 2024-01-23 ENCOUNTER — APPOINTMENT (OUTPATIENT)
Dept: PHYSICAL THERAPY | Facility: CLINIC | Age: 44
End: 2024-01-23
Payer: MEDICAID

## 2024-01-24 ENCOUNTER — TREATMENT (OUTPATIENT)
Dept: PHYSICAL THERAPY | Facility: CLINIC | Age: 44
End: 2024-01-24
Payer: MEDICAID

## 2024-01-24 DIAGNOSIS — S16.1XXD STRAIN OF NECK MUSCLE, SUBSEQUENT ENCOUNTER: Primary | ICD-10-CM

## 2024-01-24 PROCEDURE — 97110 THERAPEUTIC EXERCISES: CPT | Mod: GP | Performed by: PHYSICAL THERAPIST

## 2024-01-24 NOTE — PROGRESS NOTES
"Physical Therapy Re-evaluation    Patient Name: Brii Reeves  MRN: [id number]  Today's Date: 1/24/24       Current Problem  1. Cervical paraspinal muscle spasm  Follow Up In Physical Therapy      2. Strain of neck muscle, sequela  Follow Up In Physical Therapy      3. Neck pain  Follow Up In Physical Therapy      4. Strain of lumbar region, sequela  Follow Up In Physical Therapy          Insurance   Payer: [facility]  Visit Number: 13/16  Approved Visits: DINOE  TRADITIONAL/ 30V  PT OT COPAY 25 COVERAGE 85 OOP 1600(0) 3200(0)  NO AUTH REQ REF# CLARENCE P [id number]/ALL [facility] COMMUNITY APPROVED 8  PT  VISITS   11-10-23 THRU 12-15-23  AUTH # [id number]    Date Range: 12/18-1/26/24 16 visits      Subjective : Pt reports less pain this date,       Pain:  Pt reports neck pain/upper back  3/10, HA 2/10, dizziness 0/10 this date        Objective :  AROM c-spine:   Retract: min-mod  Ext: min-mod  L rotation: min-mod  R rotation:min   S/B R: min  S/B L: min  AROM t-spine:  ext: mod    Saulo shoulder scap stabilizers 4/5 grossly          Treatments:  UBE retro L1 - stopped at 3' increased HA  ADRIANNA 5' L1- no HA  Rep retracts: 2x10 seated  T-spine ext over towel roll supine : 2x10  Standing lat stretch @ table 30\" x5  Prone I Y T W x5 each  Open books sidelying t-spine x10 bilat  4-pt t-spine rotation bilat x10 each    Outcome measures:   NDI: 20/50  (Eval 30/50)    Goals:  1) pt demo independence w/advanced HEP- PROGRESSING  2) pt demo AROM of cervical spine min-nil deficits to perform all functional mobility for job/home tasks- PROGRESSING, REMAINS LIMITED AT CT JUNCTION  3) pt demo good posture in sit/stand to reduce pain in cervical spine- MET  4) pt reports central neck pain </=2/10 at all times to perform all functional mobility for home/job tasks- WINTER REMAINS 2/10, 3/10 NECK PAIN  5) Increased Saulo UE strength 1/2-1 muscle grade to achieve max functional strength to stabilize spine- PROGRESSING, WEAKNESS PERSISTS " IN SCAP STABILIZERS, GROSSLY 4/5  6) Decrease NDI score to < or equal to 20% for evidence of improved function - PROGRESSING               Assessment:   Pt presents this date for 13th visit of PT since start of care, pt continues to report HA daily and upper back/lower neck pain and tightness. Pt making gains in overall mobility, however remains tight and weak in upper back/scap stabilizer area. Pt to benefit from continue outpatient PT to continue working on functional goals. Pt tolerates exercises well today, but reports feeling very weak and tight, greatest limitation remains at CT junction and thoracic spine mobility, will continue to progress mobility as tolerated.  Pt reports inc HA with UBE this date so this was stopped, tolerated ADRIANNA.     Plan:     Continue with focus on AROM of cervical/thoracic spine, progression Neuro Re-ed, pain reduction and scap stabilizer strengthening.

## 2024-02-02 ENCOUNTER — TREATMENT (OUTPATIENT)
Dept: PHYSICAL THERAPY | Facility: CLINIC | Age: 44
End: 2024-02-02
Payer: MEDICAID

## 2024-02-02 DIAGNOSIS — S16.1XXD STRAIN OF NECK MUSCLE, SUBSEQUENT ENCOUNTER: ICD-10-CM

## 2024-02-02 DIAGNOSIS — M54.2 NECK PAIN: Primary | ICD-10-CM

## 2024-02-02 PROCEDURE — 97110 THERAPEUTIC EXERCISES: CPT | Mod: GP | Performed by: PHYSICAL THERAPIST

## 2024-02-06 ENCOUNTER — TREATMENT (OUTPATIENT)
Dept: PHYSICAL THERAPY | Facility: CLINIC | Age: 44
End: 2024-02-06
Payer: MEDICAID

## 2024-02-06 ENCOUNTER — TREATMENT (OUTPATIENT)
Dept: SPEECH THERAPY | Facility: CLINIC | Age: 44
End: 2024-02-06
Payer: MEDICAID

## 2024-02-06 DIAGNOSIS — S16.1XXD STRAIN OF NECK MUSCLE, SUBSEQUENT ENCOUNTER: Primary | ICD-10-CM

## 2024-02-06 DIAGNOSIS — R47.01 APHASIA: ICD-10-CM

## 2024-02-06 DIAGNOSIS — R41.89 COGNITIVE DEFICITS: Primary | ICD-10-CM

## 2024-02-06 PROCEDURE — 97140 MANUAL THERAPY 1/> REGIONS: CPT | Mod: GP | Performed by: PHYSICAL THERAPIST

## 2024-02-06 PROCEDURE — 92507 TX SP LANG VOICE COMM INDIV: CPT | Mod: GN

## 2024-02-06 NOTE — PROGRESS NOTES
Physical Therapy Treatment    Patient Name: Brii Reeves  MRN: [id number]  Today's Date: 1/24/24       Current Problem  1. Cervical paraspinal muscle spasm  Follow Up In Physical Therapy      2. Strain of neck muscle, sequela  Follow Up In Physical Therapy      3. Neck pain  Follow Up In Physical Therapy      4. Strain of lumbar region, sequela  Follow Up In Physical Therapy          Insurance   Payer: [facility]  Visit Number: 14/16  [facility]  PT OT COPAY 25 COVERAGE 85 OOP 1600(0) 3200(0)  NO AUTH REQ REF# CLARENCE P [id number]/ALL [facility] COMMUNITY   Date Range: 12/18-1/26/24 16 visits      Subjective : Pt reports less pain this date,       Pain:  Pt reports neck pain/upper back  3/10, HA 2/10, dizziness 0/10 this date        Objective :  AROM c-spine:   Retract: min-mod  Ext: min-mod  L rotation: min-mod  R rotation:min   S/B R: min  S/B L: min  AROM t-spine:  ext: mod    Saulo shoulder scap stabilizers 4/5 grossly          Treatments:  Manual:  Supine traction/retract/ext off table 4x10  SO release  MFR to bilateral upper trap/levators  Manual pec minor stretching           Assessment:   Pt reports increased soreness after strengthening last visit, focused on manual this date to reduce c/o pain and facilitate soft tissue extensibility, pt tolerated well, reports less pain after session in midback, WINTER persists.     Plan:     Continue with focus on AROM of cervical/thoracic spine, progression Neuro Re-ed, pain reduction and scap stabilizer strengthening.

## 2024-02-06 NOTE — PROGRESS NOTES
Speech-Language Pathology    Outpatient Speech-Language Pathology Treatment     Patient Name: Brii Reeves  MRN: [id number]  Today's Date: 2/7/2024            Current Problem:   1. Cognitive deficits        2. Aphasia              SLP Assessment:   Patient was given a task where she had to come up with common expressions based on a diagram.  Initially patient required moderate cueing from the clinician to complete the task.  As she completed more of the diagram she improved her ability to independently complete the task.  Patient was given the activity to complete at home with family members with her cueing them for the correct answer.  Next, patient was given a deductive reasoning puzzle.  Patient required moderate cues to complete the table of information based on the 10 statements to complete the deductive reasoning puzzle.  Patient has had significant improvement in her emotional liability.  She stated that she continues to become overwhelmed at times but has been able to use coping strategies.  She also stated that her boss gave her 3 tasks to complete and that she ask him to text her the 3 independent tasks so that she would be able to remember all 3 task and complete them.  Use of compensatory strategies such as using email or texting to guarantee that she does not misinformation was highly recommended.  Recommending to continue with speech therapy at this time.       Plan:   Speech therapy is recommended 1 time per week for 12 weeks.    Subjective   Patient was seen today from 3:25 PM until 4:05 PM for a 40-minute speech-language therapy session.  Patient reports that she has noticed improvements in her ability to complete her job responsibilities at work as a .  She stated that she is able to effectively make the schedule and that she has not had issues with people not having the appropriate work schedule.    Insurance:  Reviewed: Yes  Number of Authorized Visits:    Total Number of Visits:   2  Onset Date: 10/14/2023       Objective:  1.  Patient will be able to complete executive functioning/visual-spatial task with 90% accuracy.  2.  Patient will be able to identify 10 items in a category for divergent categorization task.  3.  Patient will be able to complete attention task with 90% accuracy.  4.  Patient will be able to perform at conversational speech level without word finding deficits with 90% accuracy.  5.  Patient will be able to use relaxation breathing techniques, visualization techniques, and self-awareness techniques to control/reduce her emotional liability.  6.  Patient will be able to complete a home program daily as reported by patient during treatment session.              Outpatient Education:   Educated patient regarding use of compensatory memory techniques such as grocery list, requesting emails or text from her employer with regards to completing tasks so that she can refer back to them and use of calendars and reminders on her smart phone.

## 2024-02-07 PROBLEM — R47.01 APHASIA: Status: ACTIVE | Noted: 2024-02-07

## 2024-02-07 NOTE — PROGRESS NOTES
Physical Therapy Treatment    Patient Name: Brii Reeves  MRN: [id number]  Today's Date: 1/24/24       Current Problem  1. Cervical paraspinal muscle spasm  Follow Up In Physical Therapy      2. Strain of neck muscle, sequela  Follow Up In Physical Therapy      3. Neck pain  Follow Up In Physical Therapy      4. Strain of lumbar region, sequela  Follow Up In Physical Therapy          Insurance   Payer: [facility]  Visit Number: 14/16  Approved Visits: ANTHEM  TRADITIONAL/ 30V  PT OT COPAY 25 COVERAGE 85 OOP 1600(0) 3200(0)  NO AUTH REQ REF# CLARENCE P [id number]/ALL [facility] COMMUNITY APPROVED 8  PT  VISITS   11-10-23 THRU 12-15-23  AUTH # [id number]    Date Range: 12/18-1/26/24 16 visits      Subjective : Pt reports increased tightness this date from traveling to [place]      Pain:  Pt reports neck pain/upper back  4/10, HA 2/10, dizziness 0/10 this date        Objective :  AROM c-spine:   Retract: min-mod  Ext: min-mod  L rotation: min-mod  R rotation:min   S/B R: min  S/B L: min  AROM t-spine:  ext: mod    Saulo shoulder scap stabilizers 4/5 grossly          Treatments:  Dry needling performed this date to T2-T7 paraspinals, 12 needles 0.25x30 mm inserted per protocol with fascial winding technique left in situ   IASTM after DN to reduce post needling soreness and facilitate soft tissue mobility           Assessment:   Pt reports increased soreness and tightness this date. Performed springing to assess mobility of T2-T7 spine, remains tight and limited, dry needling performed this date to area T2-T7, 12 0.25x30 mm needles inserted, fascial winding technique performed. all needles removed, area inspected for adverse symptoms, none noted, patient educated in post- dry needling management and expected symptoms from treatment. Followed with IASTM to further facilitate soft tissue mobility, pt tolerates well, all patient questions answered.     Plan:     Continue with focus on AROM of cervical/thoracic spine,  progression Neuro Re-ed, pain reduction and scap stabilizer strengthening.

## 2024-02-08 ENCOUNTER — APPOINTMENT (OUTPATIENT)
Dept: PHYSICAL THERAPY | Facility: CLINIC | Age: 44
End: 2024-02-08
Payer: MEDICAID

## 2024-02-09 DIAGNOSIS — S16.1XXS STRAIN OF NECK MUSCLE, SEQUELA: ICD-10-CM

## 2024-02-09 DIAGNOSIS — S39.012S STRAIN OF LUMBAR REGION, SEQUELA: ICD-10-CM

## 2024-02-09 DIAGNOSIS — M62.838 CERVICAL PARASPINAL MUSCLE SPASM: ICD-10-CM

## 2024-02-09 RX ORDER — METHOCARBAMOL 500 MG/1
500 TABLET, FILM COATED ORAL NIGHTLY
Qty: 30 TABLET | Refills: 1 | Status: SHIPPED | OUTPATIENT
Start: 2024-02-09 | End: 2024-02-21 | Stop reason: SDUPTHER

## 2024-02-09 RX ORDER — METAXALONE 800 MG/1
TABLET ORAL
Qty: 30 TABLET | Refills: 0 | Status: SHIPPED | OUTPATIENT
Start: 2024-02-09 | End: 2024-04-11 | Stop reason: SDUPTHER

## 2024-02-09 NOTE — TELEPHONE ENCOUNTER
PT NEEDS REFILLS ON  METHOCARBAMOL  METAXALONE  [id number] - [facility] [address] AT [facility] OF [facility] [address]

## 2024-02-12 ENCOUNTER — HOSPITAL ENCOUNTER (OUTPATIENT)
Dept: NEUROLOGY | Facility: HOSPITAL | Age: 44
Discharge: HOME | End: 2024-02-12
Payer: MEDICAID

## 2024-02-12 DIAGNOSIS — S39.012S STRAIN OF LUMBAR REGION, SEQUELA: ICD-10-CM

## 2024-02-12 DIAGNOSIS — M62.838 CERVICAL PARASPINAL MUSCLE SPASM: ICD-10-CM
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
## 2024-02-12 PROCEDURE — 95910 NRV CNDJ TEST 7-8 STUDIES: CPT

## 2024-02-12 NOTE — PROCEDURES
Electromyography Laboratory Report       Accreditation with Exemplary Status       Name MONIQUE WANG MRN [id number] Ref Provider RYAN PINA Technologist VINI Husain   Gender Female Age 43 Years EDX Physician Ben Dutton MD  Temp ºC 36    1980 Height 5 feet 6 inch Order No [id number] Study Date 2024 11:44 AM      Reason for Referral:   Lumbar Radiculopathy,     MONIQUE WANG [id number] 2024 11:44 AM     1 of 1      Motor Nerve Conduction Study       Nerve/Sites Muscle Amplitude Latency Velocity F min     mV ms m/s ms     Right Left Ref. Right Left Ref. Right Left Ref. Right Left Ref.   Peroneal - EDB      Ankle EDB 8.0 8.2 >=3.0 4.1 4.4 <=5.5    41.9 42.2 <=58.8      B. Fibula EDB 7.5 8.0  10.2 9.9  50.8 53.3 >=40.0         A. Fibula EDB               Tibial - AH      Ankle AH 9.1 10.3 >=8.0 4.6 5.4 <=6.0    44.8 44.8 <=57.5      Pop Fossa AH 11.0 9.7  11.4 11.8  49.9 53.0 >=40.0         G Fold AH                   Sensory Nerve Conduction Study       Nerve/Sites Rec. Site Amplitude Peak Lat Velocity     µV ms m/s     Right Left Ref. Right Left Ref. Right Left Ref.   Sural      Post Calf Post Ankle 22.6 29.0 >=5.0 3.1 3.5 <=4.5 61.1 52.7 >=40.0   Superficial Peroneal      Lat Leg Ankle 20.6 11.0 >=5.0 3.2 3.4 <=4.5 39.2 48.0 >=40.0           EMG Summary Table     Spontaneous Voluntary Activity   Muscle Nerve Roots Ins Act Fibs/PSW Fasc Other Amp Dur Phases Recruitment Activation Notes   L. EDB - Ext Dig Brev Tibial L5-S1 NL 0 0 - NL NL NL NL NL -   L. TA - Tib Ant Deep peroneal (Fibular) L4-L5 NL 0 0 - NL NL NL NL Fair -   L. PL - Per Long Peroneal L5-S1 NL 0 0 - NL NL NL NL NL -   L. AHB - Abd Paul Brev Tibial S1-S2 NL 0 0 - NL NL NL NL NL -   L. MG - Med Gastroc Tibial S1-S2 NL 0 0 - NL NL NL NL Fair -   L. VL - Vast Lat Femoral L2-L4 NL 0 0 - NL NL NL NL NL -   L. Lumbar psp (low)  - NL 0 0 - NL NL NL NL NL -   L. Lumbar psp (mid)  - NL 0 0 - NL NL NL NL NL -   L. Lumbar psp (up)  - NL 0 0 - NL NL NL NL  NL -   R. EDB - Ext Dig Brev Tibial L5-S1 NL 0 0 - NL NL NL NL Fair -   R. TA - Tib Ant Deep peroneal (Fibular) L4-L5 NL 0 0 - NL NL NL NL NL -   R. PL - Per Long Peroneal L5-S1 NL 0 0 - NL NL NL NL NL -   R. AHB - Abd Paul Brev Tibial S1-S2 NL 0 0 - NL NL NL NL NL -   R. MG - Med Gastroc Tibial S1-S2 NL 0 0 - NL NL NL NL NL -   R. VL - Vast Lat Femoral L2-L4 NL 0 0 - NL NL NL NL Fair -   R. Lumbar psp (low)  - NL 0 0 - NL NL NL NL NL -   R. Lumbar psp (mid)  - NL 0 0 - NL NL NL NL NL -   R. Lumbar psp (up)  - NL 0 0 - NL NL NL NL NL -             NL = Normal, Inc = Increased, Dec = Decreased, CRD = Complex Repetitive Discharge; SL = Slightly Decreased; MO = Moderately Decreased; MK = Markedly Decreased; N/+1, +1, +2, +3 = Borderline, Slightly, Moderately, Markedly Increased; N/-1, -1, -2, -3 = Borderline, Slightly, Moderately, Markedly Decreased, N/A = Not Applicable    Summary:     Impression:   This is a normal electroneuromyogram. There is no [facility] evidence of neuropathic or myopathic processes. In particular, there is no evidence by this study of mononeuropathies, peripheral neuropathy, or radiculopathy.

## 2024-02-13 ENCOUNTER — TREATMENT (OUTPATIENT)
Dept: SPEECH THERAPY | Facility: CLINIC | Age: 44
End: 2024-02-13
Payer: MEDICAID

## 2024-02-13 ENCOUNTER — TREATMENT (OUTPATIENT)
Dept: PHYSICAL THERAPY | Facility: CLINIC | Age: 44
End: 2024-02-13
Payer: MEDICAID

## 2024-02-13 DIAGNOSIS — S16.1XXD STRAIN OF NECK MUSCLE, SUBSEQUENT ENCOUNTER: Primary | ICD-10-CM

## 2024-02-13 DIAGNOSIS — R41.89 COGNITIVE DEFICITS: Primary | ICD-10-CM

## 2024-02-13 DIAGNOSIS — R47.01 APHASIA: ICD-10-CM

## 2024-02-13 PROCEDURE — 97140 MANUAL THERAPY 1/> REGIONS: CPT | Mod: GP | Performed by: PHYSICAL THERAPIST

## 2024-02-13 PROCEDURE — 92507 TX SP LANG VOICE COMM INDIV: CPT | Mod: GN

## 2024-02-13 NOTE — PROGRESS NOTES
Speech-Language Pathology    Outpatient Speech-Language Pathology Treatment     Patient Name: Brii Reeves  MRN: [id number]  Today's Date: 2/16/2024   Start time 1400  End time 1435  Total time 35 minutes     Current Problem:   1. Cognitive deficits          2. Aphasia                   SLP Assessment:  Patient reported she was able to complete her home program family by cueing them to complete, expression based on a diagram she that she completed during her last treatment session.  She stated that she was able to assist them with cues to come up with common phrases.        Plan:   Speech therapy is recommended 1 time per week for 12 weeks.        Subjective   Patient was seen today from 2:00 PM until 2:35 PM for a 35-minute speech-language therapy session.       Insurance:  Reviewed: Yes  Number of Authorized Visits:    Total Number of Visits:  3  Onset Date: 10/14/2023      Objective:  1.  Patient will be able to complete executive functioning/visual-spatial task with 90% accuracy.  2.  Patient will be able to identify 10 items in a category for divergent categorization task.  3.  Patient will be able to complete attention task with 90% accuracy.  4.  Patient will be able to perform at conversational speech level without word finding deficits with 90% accuracy.  5.  Patient will be able to use relaxation breathing techniques, visualization techniques, and self-awareness techniques to control/reduce her emotional liability.  6.  Patient will be able to complete a home program daily as reported by patient during treatment session.         Outpatient Education:   Educated patient regarding use of compensatory memory techniques such as grocery list, requesting emails or text from her employer with regards to completing tasks so that she can refer back to them and use of calendars and reminders on her smart phone.

## 2024-02-15 ENCOUNTER — TREATMENT (OUTPATIENT)
Dept: PHYSICAL THERAPY | Facility: CLINIC | Age: 44
End: 2024-02-15
Payer: MEDICAID

## 2024-02-15 DIAGNOSIS — S16.1XXD STRAIN OF NECK MUSCLE, SUBSEQUENT ENCOUNTER: ICD-10-CM

## 2024-02-15 DIAGNOSIS — M62.838 CERVICAL PARASPINAL MUSCLE SPASM: Primary | ICD-10-CM

## 2024-02-15 PROCEDURE — 97110 THERAPEUTIC EXERCISES: CPT | Mod: GP | Performed by: PHYSICAL THERAPIST

## 2024-02-15 NOTE — PROGRESS NOTES
"Physical Therapy Treatment    Patient Name: Brii Reeves  MRN: [id number]  Today's Date: 2/15/2024     PT Therapeutic Procedures Time Entry  Therapeutic Exercise Time Entry: 30                   Current Problem  No diagnosis found.    Insurance   Payer: [facility] community  Visit Number: 16  Approved Visits: 10  PT  VISITS   2-8-24 THRU 4-   AUTH# [id number]   Date Range: 2/8-4/12/24    Precautions: NA         Subjective :  Pt reports groggy today       Pain  3/10 HA/neck pain      Objective :  AROM c-spine:   Retract: min  Retract/extension: min-mod  L/R rotation: min    AROM t-spine:   L/R rotation: min  Extension: mod    Quick Dash: 19/50 (eval 30/50)    Treatments:  There Ex:   Retract/extension seated: 2x10  Thoracic extension: 2x10  Thoracic rotation/thread the needle: x10 bilat  Cat/camel: x10   Child's pose: 30\"x5  T-spine extension over bolster: 30\" x5      Goals:  1) pt demo independence w/advanced HEP- PROGRESSING  2) pt demo AROM of cervical/thoracic spine min-nil deficits to perform all functional mobility for job/home tasks- PROGRESSING, T-SPINE REMAINS MODERATELY LIMITED  3) pt demo good posture in sit/stand to reduce pain in cervical spine- MET  4) pt reports central neck pain </=2/10 at all times to perform all functional mobility for home/job tasks- PAIN FLUCTUATES, WINTER REMAINS 2-3/10 CONSTANT, MIDBACK PAIN FLUCTUATES  5) Increased Saulo UE strength 1/2-1 muscle grade to achieve max functional strength to stabilize spine- PROGRESSIONS LIMITED BY CONTINUED MIDBACK PAIN  6) Decrease NDI score to < or equal to 20% for evidence of improved function - REMAINS 22% SINCE START OF CARE    Assessment:     Pt remains tight and restricted in thoracic spine area. Cervical spine has greatly improved since start of care but Headaches persist, discussed continuing PT but also returning to referring physician to discuss further pain management and/or injections. Pt receptive.     Plan:     Progress thoracic mobility " as able/tolerated.    Goals:

## 2024-02-16 DIAGNOSIS — J01.10 ACUTE FRONTAL SINUSITIS, RECURRENCE NOT SPECIFIED: ICD-10-CM

## 2024-02-16 DIAGNOSIS — J30.1 SEASONAL ALLERGIC RHINITIS DUE TO POLLEN: ICD-10-CM

## 2024-02-20 ENCOUNTER — TREATMENT (OUTPATIENT)
Dept: SPEECH THERAPY | Facility: CLINIC | Age: 44
End: 2024-02-20
Payer: MEDICAID

## 2024-02-20 ENCOUNTER — TREATMENT (OUTPATIENT)
Dept: PHYSICAL THERAPY | Facility: CLINIC | Age: 44
End: 2024-02-20
Payer: MEDICAID

## 2024-02-20 DIAGNOSIS — R41.89 COGNITIVE DEFICITS: Primary | ICD-10-CM

## 2024-02-20 DIAGNOSIS — S16.1XXD STRAIN OF NECK MUSCLE, SUBSEQUENT ENCOUNTER: Primary | ICD-10-CM

## 2024-02-20 DIAGNOSIS — R47.01 APHASIA: ICD-10-CM

## 2024-02-20 PROCEDURE — 92507 TX SP LANG VOICE COMM INDIV: CPT | Mod: GN

## 2024-02-20 PROCEDURE — 97110 THERAPEUTIC EXERCISES: CPT | Mod: GP | Performed by: PHYSICAL THERAPIST

## 2024-02-20 RX ORDER — FLUTICASONE PROPIONATE 50 MCG
1 SPRAY, SUSPENSION (ML) NASAL 2 TIMES DAILY
Qty: 16 G | Refills: 3 | Status: SHIPPED | OUTPATIENT
Start: 2024-02-20 | End: 2024-02-21 | Stop reason: SDUPTHER

## 2024-02-20 RX ORDER — TIOCONAZOLE 6.5 %
1 OINTMENT WITH PREFILLED APPLICATOR VAGINAL DAILY
Qty: 90 TABLET | Refills: 0 | Status: SHIPPED | OUTPATIENT
Start: 2024-02-20 | End: 2024-02-21 | Stop reason: SDUPTHER

## 2024-02-20 NOTE — PROGRESS NOTES
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  She stated that she continues to use compensatory techniques for remembering her appointments and tasks that she has to do at work.  Patient brought her deductive reasoning puzzle that was given to her at the last session.  She was able to complete the puzzle without a nurse.  She stated that it took very little bit of time to complete the puzzle that she was able to completed independently.  Patient was able to complete an executive functioning task with 75% accuracy independently.  She also has been able to control her emotional liability and no longer has episodes of crying during her speech therapy session.  Patient has made significant progress since start of care.  Recommending to continue with therapy plan.       Plan:   Speech therapy is recommended 1 time per week for 12 weeks.        Subjective   Patient was seen today from 1:00 PM until 1:45 PM for a 45-minute speech-language therapy session.       Insurance:  Reviewed: Yes  Number of Authorized Visits:    Total Number of Visits:  4  Onset Date: 10/14/2023      Objective:  1.  Patient will be able to complete executive functioning/visual-spatial task with 90% accuracy.  2.  Patient will be able to identify 10 items in a category for divergent categorization task.  3.  Patient will be able to complete attention task with 90% accuracy.  4.  Patient will be able to perform at conversational speech level without word finding  deficits with 90% accuracy.  5.  Patient will be able to use relaxation breathing techniques, visualization techniques, and self-awareness techniques to control/reduce her emotional liability.  6.  Patient will be able to complete a home program daily as reported by patient during treatment session.         Outpatient Education:   Educated patient regarding but continued use of compensatory memory techniques to continue to remain successful at work and home with making it to appointments on time and completing task at work.

## 2024-02-20 NOTE — PROGRESS NOTES
Physical Therapy Treatment    Patient Name: Brii Reeves  MRN: [id number]  Today's Date: 2/20/2024     PT Therapeutic Procedures Time Entry  Therapeutic Exercise Time Entry: 40                   Current Problem  S16.1XXD    Insurance   Payer: [facility] community  Visit Number: 16  Approved Visits: 10  PT  VISITS   2-8-24 THRU 4-   AUTH# [id number]   Date Range: 2/8-4/12/24  Precautions   None       Subjective : pt reports symptoms remain mostly the same, HA is same and tightness is the same, she did get some relief from manual last session and overall feels better today as she didn't work this weekend      Pain  HA 3/10  CT junction/thoracic tightness  3/10      Objective :  AROM c-spine: WNL except:   Retract/extension: moderate limitation  AROM t-spine:  L/R rotation: min  Extension: mod    Treatments:  There Ex:   Repeated retractions seated with arms crossed: 2x10  T-spine ext over chair 2x10  T-spine rotations with table straddle x10 bilaterally    Assessment:     Pt continues to reports tightness/pain, discussed going to see PCP tomorrow, PT feels patient would benefit from consult with neurologist to discuss botox injections for continued Has, pt receptive.     Plan:     Continue with focus on ROM especially at CT junction and thoracic extension/rotation    Goals:

## 2024-02-21 ENCOUNTER — OFFICE VISIT (OUTPATIENT)
Dept: PRIMARY CARE | Facility: CLINIC | Age: 44
End: 2024-02-21
Payer: MEDICAID

## 2024-02-21 VITALS
HEART RATE: 91 BPM | HEIGHT: 65 IN | BODY MASS INDEX: 21.33 KG/M2 | WEIGHT: 128 LBS | DIASTOLIC BLOOD PRESSURE: 80 MMHG | SYSTOLIC BLOOD PRESSURE: 102 MMHG | OXYGEN SATURATION: 97 % | TEMPERATURE: 98.3 F

## 2024-02-21 DIAGNOSIS — Z12.31 ENCOUNTER FOR SCREENING MAMMOGRAM FOR MALIGNANT NEOPLASM OF BREAST: ICD-10-CM

## 2024-02-21 DIAGNOSIS — J01.10 ACUTE FRONTAL SINUSITIS, RECURRENCE NOT SPECIFIED: ICD-10-CM

## 2024-02-21 DIAGNOSIS — J32.9 SINUSITIS, UNSPECIFIED CHRONICITY, UNSPECIFIED LOCATION: ICD-10-CM

## 2024-02-21 DIAGNOSIS — J30.1 SEASONAL ALLERGIC RHINITIS DUE TO POLLEN: ICD-10-CM

## 2024-02-21 DIAGNOSIS — S39.012S STRAIN OF LUMBAR REGION, SEQUELA: ICD-10-CM

## 2024-02-21 DIAGNOSIS — R53.83 OTHER FATIGUE: ICD-10-CM

## 2024-02-21 DIAGNOSIS — S16.1XXS STRAIN OF NECK MUSCLE, SEQUELA: ICD-10-CM

## 2024-02-21 DIAGNOSIS — M62.838 CERVICAL PARASPINAL MUSCLE SPASM: Primary | ICD-10-CM

## 2024-02-21 DIAGNOSIS — Z13.220 LIPID SCREENING: ICD-10-CM

## 2024-02-21 PROCEDURE — 99214 OFFICE O/P EST MOD 30 MIN: CPT | Performed by: FAMILY MEDICINE

## 2024-02-21 RX ORDER — METAXALONE 800 MG/1
800 TABLET ORAL 2 TIMES DAILY
Qty: 30 TABLET | Refills: 1 | Status: SHIPPED | OUTPATIENT
Start: 2024-02-21 | End: 2024-05-29 | Stop reason: SDUPTHER

## 2024-02-21 RX ORDER — TIOCONAZOLE 6.5 %
1 OINTMENT WITH PREFILLED APPLICATOR VAGINAL DAILY
Qty: 90 TABLET | Refills: 1 | Status: SHIPPED | OUTPATIENT
Start: 2024-02-21

## 2024-02-21 RX ORDER — FLUTICASONE PROPIONATE 50 MCG
1 SPRAY, SUSPENSION (ML) NASAL 2 TIMES DAILY
Qty: 16 G | Refills: 3 | Status: SHIPPED | OUTPATIENT
Start: 2024-02-21

## 2024-02-21 RX ORDER — AZITHROMYCIN 250 MG/1
TABLET, FILM COATED ORAL
Qty: 6 TABLET | Refills: 0 | Status: SHIPPED | OUTPATIENT
Start: 2024-02-21 | End: 2024-02-25

## 2024-02-21 RX ORDER — METHOCARBAMOL 500 MG/1
500 TABLET, FILM COATED ORAL NIGHTLY
Qty: 30 TABLET | Refills: 1 | Status: SHIPPED | OUTPATIENT
Start: 2024-02-21 | End: 2024-04-11 | Stop reason: SDUPTHER

## 2024-02-21 ASSESSMENT — ENCOUNTER SYMPTOMS
SYNCOPE: 0
NECK PAIN: 1
NUMBNESS: 0
HEADACHES: 1
PHOTOPHOBIA: 1
TINGLING: 0
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
## 2024-02-22 ENCOUNTER — APPOINTMENT (OUTPATIENT)
Dept: PRIMARY CARE | Facility: CLINIC | Age: 44
End: 2024-02-22
Payer: MEDICAID

## 2024-02-22 ENCOUNTER — TREATMENT (OUTPATIENT)
Dept: PHYSICAL THERAPY | Facility: CLINIC | Age: 44
End: 2024-02-22
Payer: MEDICAID

## 2024-02-22 DIAGNOSIS — M54.2 NECK PAIN: Primary | ICD-10-CM

## 2024-02-22 PROCEDURE — 97110 THERAPEUTIC EXERCISES: CPT | Mod: GP | Performed by: PHYSICAL THERAPIST

## 2024-02-22 NOTE — PROGRESS NOTES
Physical Therapy Treatment    Patient Name: Brii Reeves  MRN: [id number]  Today's Date: 2/22/2024     PT Therapeutic Procedures Time Entry  Therapeutic Exercise Time Entry: 40                 Current Problem  S16.1XXD    Insurance   Payer: [facility] community  Visit Number: 18  Approved Visits: 10  PT  VISITS   2-8-24 THRU 4-   AUTH# [id number]   Date Range: 2/8-4/12/24        Precautions  none      Subjective : pt saw PCP, recommended pain management, pt scheduled on 2/29 with Dr Boyd      Pain  2/10 HA  Midback stiffness, 4/10 with end range retract/extension    Objective :  Retract: min  Retract/extension: min-mod, PDM     Treatments:  There Ex:   Retro UBE L2.0 5'  Repeated retracts/extension 2x10  Repeated retracts/extension with arms crossed 2x10  T-spine extension over 1/2 roll 2x10      Assessment:  Pt reports pain worse today, did not take muscle relaxer's yesterday so she could see how she feels without them, pt reports higher levels of pain and stiffness. Held further exercises this date due to increased pain. Discussed difference between pain management and neurology for injections, encouraged pt to evaluate her options for plan that works best for her with continued pain.     Plan:     Focus on midback mobility and strengthening as tolerated.     Goals:

## 2024-02-22 NOTE — PROGRESS NOTES
Physical Therapy Treatment    Patient Name: Brii Reeves  MRN: [id number]  Today's Date: 2/13/24       Current Problem  1. Cervical paraspinal muscle spasm  Follow Up In Physical Therapy      2. Strain of neck muscle, sequela  Follow Up In Physical Therapy      3. Neck pain  Follow Up In Physical Therapy      4. Strain of lumbar region, sequela  Follow Up In Physical Therapy          Insurance   Payer: [facility]  [facility]  APPROVED  10  PT  VISITS   2-8-24 THRU 4-   AUTH# [id number]       Subjective : Pt reports less pain this date, slept well and has been off more.       Pain:  Pt reports neck pain/upper back  1/10, HA 2/10, dizziness 0/10 this date        Objective :  AROM c-spine:   Retract: min-mod  Ext: min-mod  L rotation: min-mod  R rotation:min   S/B R: min  S/B L: min  AROM t-spine:  ext: mod    Saulo shoulder scap stabilizers 4/5 grossly          Treatments:  Dry needling for tightness in midback: T4-7, 8-0.25x30 mm needles  IASTM post needling to reduce post needling soreness         Assessment:   Pt reports tightness perists, Dry needling performed this date to area thoracic spine  . Area prepared with isopropyl alcohol in sterile fashion. 8-0.25x30 mm needles inserted, fascial winding technique performed.         all needles removed, area inspected for adverse symptoms, none noted, patient educated in post- dry needling management and expected symptoms from treatment. all patient questions answered. Followed dry needling with IASTM to reduce post needling soreness. Pt reports increased mobility after manual/needling this date.     Plan:     Continue with focus on AROM of cervical/thoracic spine, progression Neuro Re-ed, pain reduction and scap stabilizer strengthening.

## 2024-02-27 ENCOUNTER — TREATMENT (OUTPATIENT)
Dept: SPEECH THERAPY | Facility: CLINIC | Age: 44
End: 2024-02-27
Payer: MEDICAID

## 2024-02-27 ENCOUNTER — TREATMENT (OUTPATIENT)
Dept: PHYSICAL THERAPY | Facility: CLINIC | Age: 44
End: 2024-02-27
Payer: MEDICAID

## 2024-02-27 DIAGNOSIS — R47.01 APHASIA: ICD-10-CM

## 2024-02-27 DIAGNOSIS — S16.1XXD STRAIN OF NECK MUSCLE, SUBSEQUENT ENCOUNTER: Primary | ICD-10-CM

## 2024-02-27 DIAGNOSIS — R41.89 COGNITIVE DEFICITS: Primary | ICD-10-CM

## 2024-02-27 PROCEDURE — 97110 THERAPEUTIC EXERCISES: CPT | Mod: GP | Performed by: PHYSICAL THERAPIST

## 2024-02-27 PROCEDURE — 92507 TX SP LANG VOICE COMM INDIV: CPT | Mod: GN

## 2024-02-27 NOTE — PROGRESS NOTES
Physical Therapy Re-evaluation    Patient Name: Brii Reeves  MRN: [id number]  Today's Date: 2/27/2024     PT Therapeutic Procedures Time Entry  Therapeutic Exercise Time Entry: 30                   Current Problem  1. Strain of neck muscle, subsequent encounter            Current Problem  S16.1XXD    Insurance   Payer: [facility]  Visit Number: 18  Approved Visits: 10  PT  VISITS   2-8-24 THRU 4-   AUTH# [id number]   Date Range: 2/8-4/12/24    Precautions: NA         Subjective ; pt reports WINTER persists and continues to feel limitations with neck/midback mobilty      Pain  2/10 HA  2/10 midback pain    Objective :  NDI: 22/50 (eval 30/55)    AROM c-spine:   Flex: nil  Retract: min  Extension: min  L rotation: min  R rotation: nil  L SB: min  R SB: Min    AROM T-spine:   Extension: mod  L/R rotation: min-mod    Saulo UE grossly 4-4+/5      Treatments:  There Ex:   Reviewed HEP    Goals:  1) pt demo independence w/advanced HEP- MET  2) pt demo AROM of cervical spine min-nil deficits to perform all functional mobility for job/home tasks- LIMITATIONS PERSIST WITH END RANGE RETRACTION/EXTENSION CERVICAL, THORACIC ROTATION/EXTENSION  3) pt demo good posture in sit/stand to reduce pain in cervical spine-MET  4) pt reports central neck pain </=2/10 at all times to perform all functional mobility for home/job tasks- PAIN FLUCTUATES 2+/10 DEPENDENT ON ACTIVITY  5) Increased Saulo UE strength 1/2-1 muscle grade to achieve max functional strength to stabilize spine- PROGRESSIONS WITH STRENGTHENING LIMITED BY PAIN  6) Decrease NDI score to < or equal to 20% for evidence of improved function - 16% IMPROVEMENT as of today's visit        Assessment:  Pt reports continued pain and tightness in midback and CT junction, with continued overall feeling of weakness. Pt reports frustration with continued symptoms and impact on quality of life. Pt 22/50 on NDI currently (30/55 on eval). Pt reports she does have appt with pain  management 2/29/24 to discuss options to control continued symptoms.     Plan:  Hold further PT until follow up with pain management, would benefit from pain management to allow patient to tolerate continued PT to address functional deficits that persist.
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
## 2024-02-29 ENCOUNTER — OFFICE VISIT (OUTPATIENT)
Dept: PAIN MEDICINE | Facility: CLINIC | Age: 44
End: 2024-02-29
Payer: MEDICAID

## 2024-02-29 ENCOUNTER — HOSPITAL ENCOUNTER (OUTPATIENT)
Dept: RADIOLOGY | Facility: HOSPITAL | Age: 44
Discharge: HOME | End: 2024-02-29
Payer: MEDICAID

## 2024-02-29 VITALS
DIASTOLIC BLOOD PRESSURE: 92 MMHG | SYSTOLIC BLOOD PRESSURE: 151 MMHG | TEMPERATURE: 97 F | RESPIRATION RATE: 18 BRPM | HEART RATE: 98 BPM

## 2024-02-29 DIAGNOSIS — S39.012S STRAIN OF LUMBAR REGION, SEQUELA: ICD-10-CM

## 2024-02-29 DIAGNOSIS — S16.1XXS STRAIN OF NECK MUSCLE, SEQUELA: ICD-10-CM

## 2024-02-29 DIAGNOSIS — M62.838 CERVICAL PARASPINAL MUSCLE SPASM: ICD-10-CM

## 2024-02-29 PROCEDURE — 99204 OFFICE O/P NEW MOD 45 MIN: CPT | Performed by: PAIN MEDICINE

## 2024-02-29 PROCEDURE — 99214 OFFICE O/P EST MOD 30 MIN: CPT | Performed by: PAIN MEDICINE

## 2024-02-29 PROCEDURE — 72100 X-RAY EXAM L-S SPINE 2/3 VWS: CPT | Performed by: RADIOLOGY

## 2024-02-29 PROCEDURE — 72100 X-RAY EXAM L-S SPINE 2/3 VWS: CPT

## 2024-02-29 ASSESSMENT — PAIN - FUNCTIONAL ASSESSMENT: PAIN_FUNCTIONAL_ASSESSMENT: 0-10

## 2024-02-29 ASSESSMENT — PATIENT HEALTH QUESTIONNAIRE - PHQ9
2. FEELING DOWN, DEPRESSED OR HOPELESS: NOT AT ALL
SUM OF ALL RESPONSES TO PHQ9 QUESTIONS 1 AND 2: 0
1. LITTLE INTEREST OR PLEASURE IN DOING THINGS: NOT AT ALL

## 2024-02-29 ASSESSMENT — PAIN SCALES - GENERAL
PAINLEVEL: 5
PAINLEVEL_OUTOF10: 5 - MODERATE PAIN

## 2024-02-29 ASSESSMENT — PAIN DESCRIPTION - DESCRIPTORS: DESCRIPTORS: SORE;HEADACHE
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
## 2024-03-05 ENCOUNTER — APPOINTMENT (OUTPATIENT)
Dept: SPEECH THERAPY | Facility: CLINIC | Age: 44
End: 2024-03-05
Payer: MEDICAID

## 2024-03-20 ENCOUNTER — TREATMENT (OUTPATIENT)
Dept: SPEECH THERAPY | Facility: CLINIC | Age: 44
End: 2024-03-20
Payer: MEDICAID

## 2024-03-20 DIAGNOSIS — R41.89 COGNITIVE DEFICITS: ICD-10-CM

## 2024-03-20 DIAGNOSIS — R47.01 APHASIA: Primary | ICD-10-CM

## 2024-03-20 PROCEDURE — 92507 TX SP LANG VOICE COMM INDIV: CPT | Mod: GN

## 2024-03-20 NOTE — PROGRESS NOTES
Speech-Language Pathology    Outpatient Speech-Language Pathology Treatment      Patient Name: Brii Reeves  MRN: [id number]  : 1980  Today's Date: 24  Time Calculation  Start Time: 1305  Stop Time: 1415  Time Calculation (min): 70 min        Current Problem:    Cognitive deficits   Aphasia           SLP Assessment:  Patient came into the session today and stated that she has noted improvements in her ability to complete tasks at work and at home.  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  She stated that she normally will take 2 orders at a time and can usually remember each patient's order.  She stated that she sometimes will ask them to repeat their order to make sure that she has their order correct.  Speech therapist spoke with patient regarding use of compensatory memory techniques such as writing the order down or taking the order directly from the patient to the  point which is either a hand-held small device or the large screen at the servers station.  Patient indicated that she sometimes will write down the order but many times will take the order and then go to be larger screen at the  station because the hand-held device is condensed and difficult to find items on the menu when the patient orders.  Inquired as to whether a tablet system would be able to be used to allow her to access a larger screen when taking the order at tableside.  Patient indicated that this could be a possibility for her to use but that she would prefer to take orders by memory.  Spoke extensively with patient regarding use of memory aids and strategies.  Today patient did not have any emotionally labile responses such as crying.  She did have 1 episode where she stated I feel like I am going to cry but was able to control her emotions with strategies.  Next, patient was able to complete a sorting and remembering categories  activity.  She was asked to look at 16 words that could be grouped into 4 categories.  Before putting the words in category she was asked to look at the list of words and remember them approximately 1 to 2 minutes later.  Patient was able to remember 14 out of 16 of the random words.  Patient stated that she made up a story about each set of words such as the yellow Sat on the violet chair and the paper monkey was on the rocking chair.  Patient came up with this strategy independent of cueing.  Next, patient was asked to group the 16 words into 4 categories.  There were 4 words to each category.  Once she put the words in each group she was asked to memorize the words again.  Patient was able to give 16 out of 16 words correctly.  Spoke with patient about chunking information into smaller categories to improve accuracy.  Related this skill to her job as a / to remembering each order for each individual customer in a category and then entering that information into the computer to decrease errors.  Patient has made significant progress in her abilities to complete cognitive tasks in the therapy setting and home/work settings.  Continued speech therapy is recommended at this time.  Patient was scheduled for 2 additional treatment sessions.  She has used 6 of her 12 authorized visits.       Plan:   Speech therapy is recommended 1 time per week for 12 weeks.        Subjective   Patient was seen today from 1:00 PM until 1:45 PM for a 45-minute speech-language therapy session.       Insurance:  Reviewed: Yes  Number of Authorized Visits:  12  Total Number of Visits:  6  Onset Date: 10/14/2023      Objective:  1.  Patient will be able to complete executive functioning/visual-spatial task with 90% accuracy.  2.  Patient will be able to identify 10 items in a category for divergent categorization task.  3.  Patient will be able to complete attention task with 90% accuracy.  4.  Patient will be able to perform  at conversational speech level without word finding deficits with 90% accuracy.  5.  Patient will be able to use relaxation breathing techniques, visualization techniques, and self-awareness techniques to control/reduce her emotional liability.  6.  Patient will be able to complete a home program daily as reported by patient during treatment session.         Outpatient Education:   Educated patient again regarding compensatory memory techniques and strategies to use at home and in the day every day situations.

## 2024-04-10 ENCOUNTER — TREATMENT (OUTPATIENT)
Dept: SPEECH THERAPY | Facility: CLINIC | Age: 44
End: 2024-04-10
Payer: MEDICAID

## 2024-04-10 DIAGNOSIS — R41.89 COGNITIVE DEFICITS: ICD-10-CM

## 2024-04-10 DIAGNOSIS — R47.01 APHASIA: Primary | ICD-10-CM

## 2024-04-10 PROCEDURE — 92507 TX SP LANG VOICE COMM INDIV: CPT | Mod: GN

## 2024-04-11 ENCOUNTER — TELEMEDICINE (OUTPATIENT)
Dept: PRIMARY CARE | Facility: CLINIC | Age: 44
End: 2024-04-11
Payer: MEDICAID

## 2024-04-11 DIAGNOSIS — R41.89 COGNITIVE DEFICITS: ICD-10-CM

## 2024-04-11 DIAGNOSIS — S39.012S STRAIN OF LUMBAR REGION, SEQUELA: ICD-10-CM

## 2024-04-11 DIAGNOSIS — G43.909 MIGRAINE WITHOUT STATUS MIGRAINOSUS, NOT INTRACTABLE, UNSPECIFIED MIGRAINE TYPE: ICD-10-CM

## 2024-04-11 DIAGNOSIS — S16.1XXS STRAIN OF NECK MUSCLE, SEQUELA: ICD-10-CM

## 2024-04-11 DIAGNOSIS — M62.838 CERVICAL PARASPINAL MUSCLE SPASM: ICD-10-CM

## 2024-04-11 DIAGNOSIS — M54.12 CERVICAL RADICULOPATHY: ICD-10-CM

## 2024-04-11 DIAGNOSIS — M47.26 OSTEOARTHRITIS OF SPINE WITH RADICULOPATHY, LUMBAR REGION: ICD-10-CM

## 2024-04-11 DIAGNOSIS — R47.01 APHASIA: ICD-10-CM

## 2024-04-11 PROBLEM — M47.816 OSTEOARTHRITIS OF LUMBAR SPINE: Status: ACTIVE | Noted: 2024-04-11

## 2024-04-11 PROCEDURE — 99214 OFFICE O/P EST MOD 30 MIN: CPT | Performed by: FAMILY MEDICINE

## 2024-04-11 RX ORDER — PIROXICAM 20 MG/1
20 CAPSULE ORAL DAILY
Qty: 30 CAPSULE | Refills: 0 | Status: SHIPPED | OUTPATIENT
Start: 2024-04-11 | End: 2024-05-20

## 2024-04-11 RX ORDER — METHOCARBAMOL 500 MG/1
500 TABLET, FILM COATED ORAL NIGHTLY
Qty: 30 TABLET | Refills: 1 | Status: SHIPPED | OUTPATIENT
Start: 2024-04-11

## 2024-04-11 RX ORDER — METAXALONE 800 MG/1
TABLET ORAL
Qty: 30 TABLET | Refills: 0 | Status: SHIPPED | OUTPATIENT
Start: 2024-04-11 | End: 2024-05-20

## 2024-04-11 ASSESSMENT — ENCOUNTER SYMPTOMS
BACK PAIN: 1
NECK PAIN: 1

## 2024-04-11 NOTE — PATIENT INSTRUCTIONS
Recommended to get the block done     Sees pain management for pain     Obtain MRI of brain with contrast     Start piroxicam 20 mg once daily     Physical therapy ordered

## 2024-04-11 NOTE — PROGRESS NOTES
Subjective   Patient ID: Brii Reeves is a 43 y.o. female who presents for Neck Pain and Back Pain.    Patient is here due to having neck and back pain, states she has seen Dr. Albert and has had an emg and xrays of her lumbar spine. Bety recommended a steroid block in her cervical spine.     Patient is having irregular headaches and vertigo since her head injury, pt is concerned she has sagging brain syndrome.     Neck Pain     Back Pain         Review of Systems   Musculoskeletal:  Positive for back pain and neck pain.     12 Systems have been reviewed as follows.   Constitutional: Fever, weight gain, weight loss, appetite change, night sweats, fatigue, chills.  Eyes : blurry, double vision, vision, loss, tearing, redness, pain, sensitivity to light, glaucoma.  Ears, nose, mouth, and throat: Hearing loss, ringing in the ears, ear pain, nasal congestion, nasal drainage, nosebleeds, mouth, throat, irritation tooth problem.  Cardiovascular :chest pain, pressure, heart racing, palpitations, sweating, leg swelling, high or low blood pressure  Pulmonary: Cough, yellow or green sputum, blood and sputum, shortness of breath, wheezing  Gastrointestinal: Nausea, vomiting, diarrhea, constipation, pain, blood in stool, or vomitus, heartburn, difficulty swallowing  Genitourinary: incontinence, abnormal bleeding, abnormal discharge, urinary frequency, urinary hesitancy, pain, impotence sexual problem, infection, urinary retention  Musculoskeletal: Pain, stiffness, joint, redness or warmth, arthritis, back pain, weakness, muscle wasting, sprain or fracture  Neuro: Weight weakness, dizziness, change in voice, change in taste change in vision, change in hearing, loss, or change of sensation, trouble walking, balance problems coordination problems, shaking, speech problem  Endocrine , cold or heat intolerance, blood sugar problem, weight gain or loss missed periods hot flashes, sweats, change in body hair, change in libido,  increased thirst, increased urination  Heme/lymph: Swelling, bleeding, problem anemia, bruising, enlarged lymph nodes  Allergic/immunologic: H. plus nasal drip, watery itchy eyes, nasal drainage, immunosuppressed  The above were reviewed and noted negative except as noted in HPI and Problem List.    Objective   There were no vitals taken for this visit.    Physical Exam  Constitutional: Well developed, well nourished, alert and in no acute distress  Eyes: Normal external exam. Pupils equally round and reactive to light with normal accommodation and extraocular movements intact.  Neck: Supple, no lymphadenopathy or masses.  Cardiovascular: Regular rate and rhythm, normal S1 and S2, no murmurs, gallops, or rubs. Radial pulses normal. No peripheral edema.  Abdomen: soft, non tender, distended, no masses or HSM.  Pulmonary: No respiratory distress, lungs clear to auscultation bilaterally. No wheezes, rhonchi, rales.  Skin: Warm, well perfused, normal skin turgor and color.  Neurologic: Cranial nerves II-XII grossly intact.  Psychiatric: Mood calm and affect normal  Musculoskeletal: Moving all extremities without restriction    Assessment/Plan   Problem List Items Addressed This Visit             ICD-10-CM    Cervical paraspinal muscle spasm M62.838    Relevant Medications    methocarbamol (Robaxin) 500 mg tablet    metaxalone (Skelaxin) 800 mg tablet    Strain of lumbar region S39.012A    Relevant Medications    methocarbamol (Robaxin) 500 mg tablet    metaxalone (Skelaxin) 800 mg tablet    piroxicam (Feldene) 20 mg capsule    Other Relevant Orders    Referral to Physical Therapy    Cervical strain S16.1XXA    Relevant Medications    methocarbamol (Robaxin) 500 mg tablet    metaxalone (Skelaxin) 800 mg tablet    Cognitive deficits R41.89    Relevant Orders    MR brain w and wo IV contrast    Aphasia R47.01    Relevant Orders    MR brain w and wo IV contrast    Migraine headache G43.909    Relevant Orders    MR brain w  and wo IV contrast    Cervical radiculopathy M54.12    Relevant Orders    Referral to Physical Therapy    Osteoarthritis of lumbar spine M47.816    Relevant Medications    piroxicam (Feldene) 20 mg capsule    Other Relevant Orders    Referral to Physical Therapy

## 2024-04-17 ENCOUNTER — APPOINTMENT (OUTPATIENT)
Dept: SPEECH THERAPY | Facility: CLINIC | Age: 44
End: 2024-04-17
Payer: MEDICAID

## 2024-04-25 ENCOUNTER — EVALUATION (OUTPATIENT)
Dept: PHYSICAL THERAPY | Facility: CLINIC | Age: 44
End: 2024-04-25
Payer: MEDICAID

## 2024-04-25 DIAGNOSIS — M47.26 OSTEOARTHRITIS OF SPINE WITH RADICULOPATHY, LUMBAR REGION: ICD-10-CM

## 2024-04-25 DIAGNOSIS — S39.012S STRAIN OF LUMBAR REGION, SEQUELA: ICD-10-CM

## 2024-04-25 DIAGNOSIS — M54.12 CERVICAL RADICULOPATHY: ICD-10-CM

## 2024-04-25 PROCEDURE — 97161 PT EVAL LOW COMPLEX 20 MIN: CPT | Mod: GP
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
## 2024-04-30 ENCOUNTER — APPOINTMENT (OUTPATIENT)
Dept: SPEECH THERAPY | Facility: CLINIC | Age: 44
End: 2024-04-30
Payer: MEDICAID

## 2024-05-02 ENCOUNTER — TREATMENT (OUTPATIENT)
Dept: PHYSICAL THERAPY | Facility: CLINIC | Age: 44
End: 2024-05-02
Payer: MEDICAID

## 2024-05-02 DIAGNOSIS — M47.26 OSTEOARTHRITIS OF SPINE WITH RADICULOPATHY, LUMBAR REGION: ICD-10-CM

## 2024-05-02 DIAGNOSIS — M54.12 CERVICAL RADICULOPATHY: Primary | ICD-10-CM

## 2024-05-02 DIAGNOSIS — S39.012S STRAIN OF LUMBAR REGION, SEQUELA: ICD-10-CM

## 2024-05-02 PROCEDURE — 97113 AQUATIC THERAPY/EXERCISES: CPT | Mod: CQ,GP

## 2024-05-02 ASSESSMENT — PAIN DESCRIPTION - DESCRIPTORS: DESCRIPTORS: ACHING

## 2024-05-02 ASSESSMENT — PAIN SCALES - GENERAL: PAINLEVEL_OUTOF10: 3

## 2024-05-02 ASSESSMENT — PAIN - FUNCTIONAL ASSESSMENT: PAIN_FUNCTIONAL_ASSESSMENT: 0-10

## 2024-05-02 NOTE — PROGRESS NOTES
"Physical Therapy Treatment    Patient Name: Brii Reeves  MRN: [id number]  Today's Date: 5/2/2024  Time Calculation  Start Time: 1528  Stop Time: 1600  Time Calculation (min): 32 min    Insurance  [employer]  40 Units  4/29/24 to 7/29/24  30 Visit per CY (11v used as of 4/22/24)  PA Required    POC Visit #2 of 10    Current Problem  1. Cervical radiculopathy  Follow Up In Physical Therapy      2. Strain of lumbar region, sequela        3. Osteoarthritis of spine with radiculopathy, lumbar region            Subjective    General  Pt arrives to begin aquatic POC today.  States she began PT with land activities & was seeing improvement, but hit plateau.  Also still having increase in sx's following sessions.  She will begin aquatic activities to reduce stresses on spine, allowing for increased activity to strengthen, stabilize & improve overall endurance, capacity for performing daily activities.  She states her overal goal is to \"return to normal activity.\"      Precautions  Precautions  Precautions Comment: No recent falls reported    Pain  Pain Assessment  Pain Assessment: 0-10  Pain Score: 3  Pain Location:  (Neck, Lower/Mid Back)  Pain Orientation: Lower, Mid, Upper  Pain Descriptors: Aching  Pain Frequency: Constant/continuous  Effect of Pain on Daily Activities: Limits daily activities; Sleep    Objective   No current c/o numbness, tingling or radiating sx's.  Presents with mildly guarded posture.    Treatments:  Aquatic Therapy (65589): 32 Minutes, 2 Units    Activities:  Aquatic Gait Activities: (Fwd/Bkwd/Lat)  2 laps each  March in Place: x15  HR/TR: x15  Hip PRE's (flex, abd, ext) x10 each NAKIA  Staggered Stance Row: Paddles, Lv1 2x10 (switch stance after 10)  DLS w/UE mvmt (flex/ext; abd/add) Paddles, Lv1 x10  --> Add H.Abd NV  B ER with paddles: Lv1 x10  Squats: --> Add NV  Bicep Curls with DB/Paddles: --> Add NV  Noodle Rotation: --> Add NV  Noodle Press: --> Add NV    Assessment:   " Initiated aquatic activities this visit to increase postural/core strength, stability & improve functional mobility, capacity for performing daily activities and overall endurance.  She demonstrates good ability to follow both verbal/visual cues for activities given today & had good follow through to occasional reminders, vc's for maintaining scapular setting, core stability.  She was appropriately challenged with ex's given, reporting normal muscle soreness, fatigue post activity.  Anticipate she will do well with aquatic POC.  Will plan to continue with activities & progress as tolerated next visit.    Plan:   Assess response to initial aquatic visit.  Continue with activities & progress as tolerated to increase postural/core strength, stability & improve functional mobility, capacity for performing daily activities and overall endurance.

## 2024-05-06 ENCOUNTER — APPOINTMENT (OUTPATIENT)
Dept: PHYSICAL THERAPY | Facility: CLINIC | Age: 44
End: 2024-05-06
Payer: MEDICAID

## 2024-05-07 ENCOUNTER — HOSPITAL ENCOUNTER (OUTPATIENT)
Dept: RADIOLOGY | Facility: CLINIC | Age: 44
Discharge: HOME | End: 2024-05-07
Payer: MEDICAID

## 2024-05-07 DIAGNOSIS — R41.89 COGNITIVE DEFICITS: ICD-10-CM

## 2024-05-07 DIAGNOSIS — G43.909 MIGRAINE WITHOUT STATUS MIGRAINOSUS, NOT INTRACTABLE, UNSPECIFIED MIGRAINE TYPE: ICD-10-CM

## 2024-05-07 DIAGNOSIS — R47.01 APHASIA: ICD-10-CM

## 2024-05-07 PROCEDURE — 70553 MRI BRAIN STEM W/O & W/DYE: CPT | Performed by: RADIOLOGY

## 2024-05-07 PROCEDURE — 70553 MRI BRAIN STEM W/O & W/DYE: CPT

## 2024-05-07 PROCEDURE — 2500000004 HC RX 250 GENERAL PHARMACY W/ HCPCS (ALT 636 FOR OP/ED): Performed by: FAMILY MEDICINE

## 2024-05-07 PROCEDURE — A9575 INJ GADOTERATE MEGLUMI 0.1ML: HCPCS | Performed by: FAMILY MEDICINE

## 2024-05-07 RX ORDER — GADOTERATE MEGLUMINE 376.9 MG/ML
0.2 INJECTION INTRAVENOUS
Status: COMPLETED | OUTPATIENT
Start: 2024-05-07 | End: 2024-05-07

## 2024-05-07 RX ADMIN — GADOTERATE MEGLUMINE 11 ML: 376.9 INJECTION INTRAVENOUS at 11:50

## 2024-05-09 ENCOUNTER — TREATMENT (OUTPATIENT)
Dept: PHYSICAL THERAPY | Facility: CLINIC | Age: 44
End: 2024-05-09
Payer: MEDICAID

## 2024-05-09 DIAGNOSIS — M54.12 CERVICAL RADICULOPATHY: ICD-10-CM

## 2024-05-09 DIAGNOSIS — S39.012S STRAIN OF LUMBAR REGION, SEQUELA: Primary | ICD-10-CM

## 2024-05-09 DIAGNOSIS — M47.26 OSTEOARTHRITIS OF SPINE WITH RADICULOPATHY, LUMBAR REGION: ICD-10-CM

## 2024-05-09 PROCEDURE — 97113 AQUATIC THERAPY/EXERCISES: CPT | Mod: CQ,GP

## 2024-05-09 ASSESSMENT — PAIN SCALES - GENERAL: PAINLEVEL_OUTOF10: 2

## 2024-05-09 ASSESSMENT — PAIN - FUNCTIONAL ASSESSMENT: PAIN_FUNCTIONAL_ASSESSMENT: 0-10

## 2024-05-09 ASSESSMENT — PAIN DESCRIPTION - DESCRIPTORS: DESCRIPTORS: SORE

## 2024-05-09 NOTE — PROGRESS NOTES
Physical Therapy Treatment    Patient Name: Brii Reeves  MRN: [id number]  Today's Date: 5/9/2024  Time Calculation  Start Time: 1405  Stop Time: 1438  Time Calculation (min): 33 min    Insurance  [employer]  40 Units  4/29/24 to 7/29/24  30 Visit per CY (11v used as of 4/22/24)  PA Required     POC Visit #3 of 10     Current Problem  1. Strain of lumbar region, sequela        2. Cervical radiculopathy  Follow Up In Physical Therapy      3. Osteoarthritis of spine with radiculopathy, lumbar region            Subjective    General   Pt reports some increased soreness following initial pool visit.  States she was a little more sore overall into the weekend, but relates a lot of the soreness to work activities.  States she was pleased with initial aquatic visit.  She does feel as if the pool will be beneficial.  She is hoping to eventually be able to return to lap swimming.     Precautions  Precautions  Precautions Comment: No recent falls reported    Pain  Pain Assessment  Pain Assessment: 0-10  Pain Score: 2  Pain Location:  (Cervical, Mid, Lower back)  Pain Orientation: Lower, Mid, Upper  Pain Descriptors: Sore  Pain Frequency: Constant/continuous  Effect of Pain on Daily Activities: Limits daily activities; Sleep    Objective   No c/o numbness, tingling into UE/LE    Treatments:  Aquatic Therapy (05227): 33 Minutes, 2 Units    Activities:  Aquatic Gait Activities: (Fwd/Bkwd/Lat)  2 laps each  March in Place: x15  -->Progress to march across NV  HR/TR: x20  Hip PRE's (flex, abd, ext) x15 each NAKIA  Staggered Stance Row: Paddles, Lv5 2x15 (switch stance after 15)  DLS w/UE mvmt (flex/ext; abd/add) Paddles, Lv5 x15   B ER with paddles: Lv5 x15  Squats: x15  Bicep Curls with DB/Paddles: Teal/White DB, x15  Noodle Rotation: --> Add NV  Noodle Press: --> Add NV     Assessment:   Reviewed activities initiated previous visit & continued to progress with POC as tolerated.  Pt able to increase resistance  of paddles with DLS & row this visit for increased strength & stability.  She was able to complete, but with some increased difficulty noted.  Also added H.Abd to the paddle activities performed this visit with DLS.  Squats & DB curls also initiated this visit for further strengthening.  She did well with progressions made today, reporting normal muscle soreness, fatigue.  Anticipate she will do well overall with aquatic POC.  Demonstrates good ability to continue progressing activities as tolerated.    Plan:   Continue to progress with aquatic activities as tolerated to increase postural/core strength, stability & improve functional mobility, capacity for performing daily activities and overall endurance.

## 2024-05-14 ENCOUNTER — TREATMENT (OUTPATIENT)
Dept: PHYSICAL THERAPY | Facility: CLINIC | Age: 44
End: 2024-05-14
Payer: MEDICAID

## 2024-05-14 DIAGNOSIS — M54.12 CERVICAL RADICULOPATHY: ICD-10-CM

## 2024-05-14 DIAGNOSIS — S39.012S STRAIN OF LUMBAR REGION, SEQUELA: ICD-10-CM

## 2024-05-14 DIAGNOSIS — M47.26 OSTEOARTHRITIS OF SPINE WITH RADICULOPATHY, LUMBAR REGION: Primary | ICD-10-CM

## 2024-05-14 PROCEDURE — 97113 AQUATIC THERAPY/EXERCISES: CPT | Mod: CQ,GP

## 2024-05-14 ASSESSMENT — PAIN SCALES - GENERAL: PAINLEVEL_OUTOF10: 3

## 2024-05-14 ASSESSMENT — PAIN - FUNCTIONAL ASSESSMENT: PAIN_FUNCTIONAL_ASSESSMENT: 0-10

## 2024-05-14 ASSESSMENT — PAIN DESCRIPTION - DESCRIPTORS: DESCRIPTORS: ACHING;SHARP

## 2024-05-14 NOTE — PROGRESS NOTES
Physical Therapy Treatment    Patient Name: Brii Reeves  MRN: [id number]  Today's Date: 5/14/2024  Time Calculation  Start Time: 1535  Stop Time: 1610  Time Calculation (min): 35 min    Insurance  [employer]  40 Units  4/29/24 to 7/29/24  30 Visit per CY (11v used as of 4/22/24)  PA Required     POC Visit #4 of 10     Current Problem  1. Osteoarthritis of spine with radiculopathy, lumbar region        2. Cervical radiculopathy  Follow Up In Physical Therapy      3. Strain of lumbar region, sequela            Subjective    General   Pt reports increase in sx's following previous visit.  Pt reports increase in sx's  for about 3 days.  States she began to feel some relief by Sunday (5/12/24).  She reports some soreness today, but otherwise has no new complaints.     Precautions  Precautions  Precautions Comment: No recent falls reported    Pain  Pain Assessment  Pain Assessment: 0-10  Pain Score: 3 (Pain at worst since previous visit 5/10)  Pain Location: Back (Cervical, Mid, Low)  Pain Descriptors: Aching, Sharp  Pain Frequency: Constant/continuous  Effect of Pain on Daily Activities: Limits daily activities, sleep    Objective   No c/o numbness or tingling in UE/LE's    Treatments:  Aquatic Therapy (84007): 35 Minutes, 2 Units    Activities:  Aquatic Gait Activities: (Fwd/Bkwd/Lat)  2 laps each  March in Place: x15  -->Progress to march across NV  HR/TR: x20  Hip PRE's (flex, abd, ext) x10 each NAKIA  Staggered Stance Row: Paddles, Lv1 2x10 (switch stance after 10)  DLS w/UE mvmt (flex/ext; abd/add) Paddles, Lv1 x10 each  B ER with paddles: Lv1 x10  Squats: x15  Bicep Curls with DB/Paddles: Teal/White DB, x15  Noodle Rotation: --> Add NV  Noodle Press: --> Add NV       Assessment:   Pt continued with current activities this visit without progressions secondary to increase in sx's & recovery time reported following previous visit.  She demonstrates good ability to complete current activities today  without any further increase in sx's reported.  Some modification were made to activities (decreased reps, reduced resistance) for better tolerance.  Anticipate she will be able to continue with progression of aquatic activities again at next visit.    Plan:   Assess response to today's session.  Continue to progress with aquatic activities as tolerated to increase postural/core strength, stability & improve functional mobility, capacity for performing daily activities and overall endurance.

## 2024-05-17 DIAGNOSIS — M62.838 CERVICAL PARASPINAL MUSCLE SPASM: ICD-10-CM

## 2024-05-17 DIAGNOSIS — S16.1XXS STRAIN OF NECK MUSCLE, SEQUELA: ICD-10-CM

## 2024-05-17 DIAGNOSIS — S39.012S STRAIN OF LUMBAR REGION, SEQUELA: ICD-10-CM

## 2024-05-17 NOTE — TELEPHONE ENCOUNTER
[facility]     PT CALLED IN FOR MED REFILL    METHOCARBAMOL    [facility] [place]       PLEASE CALL PATIENT WITH MRI RESULTS

## 2024-05-18 DIAGNOSIS — S39.012S STRAIN OF LUMBAR REGION, SEQUELA: ICD-10-CM

## 2024-05-18 DIAGNOSIS — M62.838 CERVICAL PARASPINAL MUSCLE SPASM: ICD-10-CM

## 2024-05-18 DIAGNOSIS — S16.1XXS STRAIN OF NECK MUSCLE, SEQUELA: ICD-10-CM

## 2024-05-20 DIAGNOSIS — M47.26 OSTEOARTHRITIS OF SPINE WITH RADICULOPATHY, LUMBAR REGION: ICD-10-CM

## 2024-05-20 DIAGNOSIS — S39.012S STRAIN OF LUMBAR REGION, SEQUELA: ICD-10-CM

## 2024-05-20 RX ORDER — PIROXICAM 20 MG/1
20 CAPSULE ORAL DAILY
Qty: 30 CAPSULE | Refills: 3 | Status: SHIPPED | OUTPATIENT
Start: 2024-05-20

## 2024-05-20 RX ORDER — METAXALONE 800 MG/1
TABLET ORAL
Qty: 30 TABLET | Refills: 0 | Status: SHIPPED | OUTPATIENT
Start: 2024-05-20

## 2024-05-21 ENCOUNTER — TREATMENT (OUTPATIENT)
Dept: PHYSICAL THERAPY | Facility: CLINIC | Age: 44
End: 2024-05-21
Payer: MEDICAID

## 2024-05-21 DIAGNOSIS — M47.26 OSTEOARTHRITIS OF SPINE WITH RADICULOPATHY, LUMBAR REGION: Primary | ICD-10-CM

## 2024-05-21 DIAGNOSIS — M54.12 CERVICAL RADICULOPATHY: ICD-10-CM

## 2024-05-21 DIAGNOSIS — S39.012S STRAIN OF LUMBAR REGION, SEQUELA: ICD-10-CM

## 2024-05-21 PROCEDURE — 97113 AQUATIC THERAPY/EXERCISES: CPT | Mod: CQ,GP

## 2024-05-21 ASSESSMENT — PAIN - FUNCTIONAL ASSESSMENT: PAIN_FUNCTIONAL_ASSESSMENT: 0-10

## 2024-05-21 ASSESSMENT — PAIN DESCRIPTION - DESCRIPTORS: DESCRIPTORS: SORE

## 2024-05-21 ASSESSMENT — PAIN SCALES - GENERAL: PAINLEVEL_OUTOF10: 3

## 2024-05-21 NOTE — PROGRESS NOTES
"Physical Therapy Treatment    Patient Name: Brii Reeves  MRN: [id number]  Today's Date: 5/21/2024  Time Calculation  Start Time: 1551  Stop Time: 1625  Time Calculation (min): 34 min    Insurance  [employer]  40 Units  4/29/24 to 7/29/24  30 Visit per CY (11v used as of 4/22/24)  PA Required     POC Visit #5 of 10     Current Problem  1. Osteoarthritis of spine with radiculopathy, lumbar region        2. Cervical radiculopathy  Follow Up In Physical Therapy      3. Strain of lumbar region, sequela            Subjective    General   Pt reports aggravating neck & shoulders at work over the weekend.  Also reports having someone come over & help her move stuff around in the basement this morning, so is \"just a little ouchy\" today.  States she would just like to work on LE stuff today.     Precautions  Precautions  Precautions Comment: No recent falls reported    Pain  Pain Assessment  Pain Assessment: 0-10  Pain Score: 3  Pain Location: Back  Pain Orientation: Mid, Lower  Pain Descriptors: Sore  Pain Frequency: Constant/continuous  Effect of Pain on Daily Activities: Limits daily activities; Sleep    Objective   Denies any numbness, tingling    Treatments:  Aquatic Therapy (98218): 34 Minutes, 2 Units    Activities:  Aquatic Gait Activities: (Fwd/Bkwd/Lat)  2 laps each  March Across (high knee)1 lap  HR/TR: x20  Hip PRE's (flex, abd, ext) x10 each NAKIA   Staggered Stance Row: Paddles, Lv1 2x10 (switch stance after 10) (not this visit)  DLS w/UE mvmt (flex/ext; abd/add) Paddles, Lv1 x10 each (not this visit)  B ER with paddles: Lv1 x10 (not this visit)  Squats: x15  Bicep Curls with DB/Paddles: Teal/White DB, x15 (not this visit)  Noodle Rotation: --> Add NV  Noodle Press: --> Add NV  LE noodle press x10 each       Assessment:   Focused more on LE/core activities this visit per pt request, due to aggravating shoulders/neck with increased activity recently.  She was able to progress some activities " to further improve LE/core strength, stability.  March in place progressed to march across pool this visit with high knee, which challenged balance more.  However, demonstrates improved stability & able to perform standing hip exercises this session without holding on for UE support.  She was able to add LE noodle press to the activities performed today for further strengthening.  Overall, did well with session, with no increase in sx's reported.  Anticipate she will be able to continue with POC next visit & progress as tolerated.    Plan:   Continue to progress with aquatic activities as tolerated to increase postural/core strength, stability & improve functional mobility, capacity for performing daily activities and overall endurance.

## 2024-05-28 ENCOUNTER — TREATMENT (OUTPATIENT)
Dept: PHYSICAL THERAPY | Facility: CLINIC | Age: 44
End: 2024-05-28
Payer: MEDICAID

## 2024-05-28 DIAGNOSIS — M54.12 CERVICAL RADICULOPATHY: ICD-10-CM

## 2024-05-28 PROCEDURE — 97113 AQUATIC THERAPY/EXERCISES: CPT | Mod: GP,CQ

## 2024-05-28 NOTE — PROGRESS NOTES
Patient Name: Brii Reeves  MRN: [id number]  Today's Date: 5/28/2024  Time Calculation  Start Time: 1058  Stop Time: 1130  Time Calculation (min): 32 min  Insurance  [employer]  40 Units  4/29/24 to 7/29/24  30 Visit per CY (11v used as of 4/22/24)  PA Required     POC Visit #6 of 10    Subjective:   Difficulty with ADLS still without increasing symptoms . Reports carrying a chair up  from the basement this morning which increased cervical /scapular soreness.  Reports that modification with aquatic session last visit was helpful with decreasing increased cervical pain and feels that overall aquatics seems to be helping .    Objective:  Poor cervical /postural endurance     Assessment:   Pt requires mod cues for proper posture with exercises.  Water resistance only for UE movements due to difficulty with maintaining posture with UE/LE involvement.     Plan:   Progress postural stability as able.    Treatment :   Aquatic Gait Activities: (Fwd/Bkwd/Lat)  2 laps each Posture   March Across 2 laps posture   HR/TR: x20  Hip PRE's (flex, abd, ext) 2x10 each NAKIA   B ER x10 with DNF   DNF with fly /clap/[place] x10 each slow   Corner pec stretch 2 x30   Squats: x15  Bicep Curls x10   Noodle rotation x10  LE noodle press x10 each              Current Problem:  1. Cervical radiculopathy  Follow Up In Physical Therapy              Pain:  3/10    Location: Cervical /Scapular

## 2024-05-29 RX ORDER — METAXALONE 800 MG/1
800 TABLET ORAL 2 TIMES DAILY
Qty: 60 TABLET | Refills: 2 | Status: SHIPPED | OUTPATIENT
Start: 2024-05-29

## 2024-05-29 RX ORDER — METHOCARBAMOL 500 MG/1
500 TABLET, FILM COATED ORAL NIGHTLY
Qty: 90 TABLET | Refills: 1 | Status: CANCELLED | OUTPATIENT
Start: 2024-05-29

## 2024-06-04 ENCOUNTER — TREATMENT (OUTPATIENT)
Dept: PHYSICAL THERAPY | Facility: CLINIC | Age: 44
End: 2024-06-04
Payer: MEDICAID

## 2024-06-04 DIAGNOSIS — M54.12 CERVICAL RADICULOPATHY: Primary | ICD-10-CM

## 2024-06-04 DIAGNOSIS — M47.26 OSTEOARTHRITIS OF SPINE WITH RADICULOPATHY, LUMBAR REGION: ICD-10-CM

## 2024-06-04 DIAGNOSIS — S39.012S STRAIN OF LUMBAR REGION, SEQUELA: ICD-10-CM

## 2024-06-04 PROCEDURE — 97113 AQUATIC THERAPY/EXERCISES: CPT | Mod: CQ,GP

## 2024-06-04 ASSESSMENT — PAIN DESCRIPTION - DESCRIPTORS: DESCRIPTORS: SORE

## 2024-06-04 ASSESSMENT — PAIN - FUNCTIONAL ASSESSMENT: PAIN_FUNCTIONAL_ASSESSMENT: 0-10

## 2024-06-04 NOTE — PROGRESS NOTES
Physical Therapy Treatment    Patient Name: Brii Reeves  MRN: [id number]  Today's Date: 6/4/2024  Time Calculation  Start Time: 1311  Stop Time: 1333  Time Calculation (min): 22 min    Insurance  [employer]  40 Units  4/29/24 to 7/29/24  30 Visit per CY (11v used as of 4/22/24)  PA Required     POC Visit #7 of 10    Current Problem  1. Cervical radiculopathy  Follow Up In Physical Therapy      2. Osteoarthritis of spine with radiculopathy, lumbar region        3. Strain of lumbar region, sequela            Subjective    General   Pt reports feeling the same since last visit, but also feels like aquatic therapy has been helping overall.  Pt reports she still feels  limited in her work activities and feels weak. She is still not able to  turn head without pain.      Precautions  Precautions  Precautions Comment: No recent falls reported     Pain  Pain Assessment  Pain Assessment: 0-10  Pain Location: Back  Pain Orientation: Mid, Lower  Pain Descriptors: Sore  Pain Frequency: Constant/continuous  Effect of Pain on Daily Activities: Limits daily activities; Sleep    Objective   Treatments:  Aquatic Therapy (78920): 22 Minutes, 1 Units    Activities:  Aquatic Gait Activities: (Fwd/Bkwd/Lat)  2 laps each Posture   March Across 2 laps posture  HR/TR: x20 (not this visit)  Hip PRE's (flex, abd, ext) 2x10 each NAKIA (not this visit)  B ER x10 with DNF (not this visit)  DNF with fly /clap/[place] x10 each slow  (not this visit)  Corner pec stretch 2 x30 (not this visit)  Squats: x15(Bicep Curls x10  (not this visit)  Noodle rotation x10   LE noodle press x15 each (not this visit)    Assessment:   Shortened session today secondary to pt late arrival.   Pt tolerated all activities well that were done this session and responded well to vc's for postural adjustments (especially head and neck) during activities.  Added LE noodle press to the activities performed this visit.  During session, pt reports that she  likes doing the noodle leg press exercise.  Pt will continue to benefit from aquatic PT sessions to address pain, increase strength, and activity tolerance to improve performance of ADLs and work activities.        Treatment performed and documented by MICHAEL Puri  Supervised by [facility] PTA    Plan:   Continue with strength, mobility, and core stability activities as tolerated. Continue education for posture and activity pacing.

## 2024-06-27 ENCOUNTER — APPOINTMENT (OUTPATIENT)
Dept: NEUROLOGY | Facility: CLINIC | Age: 44
End: 2024-06-27
Payer: MEDICAID

## 2024-06-27 VITALS
HEART RATE: 95 BPM | HEIGHT: 66 IN | DIASTOLIC BLOOD PRESSURE: 82 MMHG | RESPIRATION RATE: 16 BRPM | BODY MASS INDEX: 20.89 KG/M2 | SYSTOLIC BLOOD PRESSURE: 121 MMHG | WEIGHT: 130 LBS

## 2024-06-27 DIAGNOSIS — G43.709 CHRONIC MIGRAINE W/O AURA W/O STATUS MIGRAINOSUS, NOT INTRACTABLE: Primary | ICD-10-CM

## 2024-06-27 DIAGNOSIS — G44.52 NDPH (NEW DAILY PERSISTENT HEADACHE): ICD-10-CM

## 2024-06-27 PROCEDURE — 99205 OFFICE O/P NEW HI 60 MIN: CPT | Performed by: STUDENT IN AN ORGANIZED HEALTH CARE EDUCATION/TRAINING PROGRAM

## 2024-06-27 ASSESSMENT — ENCOUNTER SYMPTOMS
DEPRESSION: 0
OCCASIONAL FEELINGS OF UNSTEADINESS: 0
LOSS OF SENSATION IN FEET: 0

## 2024-06-27 ASSESSMENT — PATIENT HEALTH QUESTIONNAIRE - PHQ9
1. LITTLE INTEREST OR PLEASURE IN DOING THINGS: NOT AT ALL
SUM OF ALL RESPONSES TO PHQ9 QUESTIONS 1 AND 2: 0
2. FEELING DOWN, DEPRESSED OR HOPELESS: NOT AT ALL
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
## 2024-08-21 DIAGNOSIS — J30.1 SEASONAL ALLERGIC RHINITIS DUE TO POLLEN: ICD-10-CM

## 2024-08-22 ENCOUNTER — TELEPHONE (OUTPATIENT)
Dept: PAIN MEDICINE | Facility: CLINIC | Age: 44
End: 2024-08-22
Payer: MEDICAID

## 2024-08-22 DIAGNOSIS — M47.812 SPONDYLOSIS WITHOUT MYELOPATHY OR RADICULOPATHY, CERVICAL REGION: Primary | ICD-10-CM

## 2024-08-22 RX ORDER — FLUTICASONE PROPIONATE 50 MCG
1 SPRAY, SUSPENSION (ML) NASAL 2 TIMES DAILY
Qty: 16 G | Refills: 0 | Status: SHIPPED | OUTPATIENT
Start: 2024-08-22

## 2024-08-22 NOTE — TELEPHONE ENCOUNTER
DR [facility] PT    PT PHONED OFFICE AND IS REQUESTING REFILL ON     METHOCARBAMOL 500 MG        [facility]

## 2024-08-22 NOTE — TELEPHONE ENCOUNTER
Called patient LVM explaining that I have left a msg. For Dr. Albert to lae an order for a medial nerve branch block w/a bethany order left office number for patient to CB.

## 2024-09-03 ENCOUNTER — LAB REQUISITION (OUTPATIENT)
Dept: LAB | Facility: HOSPITAL | Age: 44
End: 2024-09-03
Payer: MEDICAID

## 2024-09-03 DIAGNOSIS — J02.9 ACUTE PHARYNGITIS, UNSPECIFIED: ICD-10-CM

## 2024-09-03 PROCEDURE — 87651 STREP A DNA AMP PROBE: CPT

## 2024-09-04 LAB — S PYO DNA THROAT QL NAA+PROBE: NOT DETECTED

## 2024-09-05 ENCOUNTER — OFFICE VISIT (OUTPATIENT)
Dept: PRIMARY CARE | Facility: CLINIC | Age: 44
End: 2024-09-05
Payer: MEDICAID

## 2024-09-05 ENCOUNTER — LAB (OUTPATIENT)
Dept: LAB | Facility: LAB | Age: 44
End: 2024-09-05
Payer: MEDICAID

## 2024-09-05 VITALS
OXYGEN SATURATION: 99 % | SYSTOLIC BLOOD PRESSURE: 128 MMHG | TEMPERATURE: 98.3 F | BODY MASS INDEX: 20.86 KG/M2 | WEIGHT: 129.8 LBS | HEIGHT: 66 IN | DIASTOLIC BLOOD PRESSURE: 68 MMHG | HEART RATE: 110 BPM | RESPIRATION RATE: 16 BRPM

## 2024-09-05 DIAGNOSIS — F51.04 PSYCHOPHYSIOLOGICAL INSOMNIA: ICD-10-CM

## 2024-09-05 DIAGNOSIS — R35.0 URINARY FREQUENCY: ICD-10-CM

## 2024-09-05 DIAGNOSIS — S39.012S STRAIN OF LUMBAR REGION, SEQUELA: Primary | ICD-10-CM

## 2024-09-05 DIAGNOSIS — N92.6 ABNORMAL MENSTRUAL PERIODS: ICD-10-CM

## 2024-09-05 DIAGNOSIS — J02.0 PHARYNGITIS DUE TO STREPTOCOCCUS SPECIES: ICD-10-CM

## 2024-09-05 DIAGNOSIS — S16.1XXS STRAIN OF NECK MUSCLE, SEQUELA: ICD-10-CM

## 2024-09-05 DIAGNOSIS — M62.838 CERVICAL PARASPINAL MUSCLE SPASM: ICD-10-CM

## 2024-09-05 LAB
POC APPEARANCE, URINE: ABNORMAL
POC BILIRUBIN, URINE: NEGATIVE
POC BLOOD, URINE: NEGATIVE
POC COLOR, URINE: YELLOW
POC GLUCOSE, URINE: NEGATIVE MG/DL
POC KETONES, URINE: NEGATIVE MG/DL
POC LEUKOCYTES, URINE: NEGATIVE
POC NITRITE,URINE: NEGATIVE
POC PH, URINE: 5.5 PH
POC PROTEIN, URINE: NEGATIVE MG/DL
POC SPECIFIC GRAVITY, URINE: 1.02
POC UROBILINOGEN, URINE: 0.2 EU/DL
PREGNANCY TEST URINE, POC: NEGATIVE

## 2024-09-05 PROCEDURE — 81025 URINE PREGNANCY TEST: CPT | Performed by: FAMILY MEDICINE

## 2024-09-05 PROCEDURE — 81002 URINALYSIS NONAUTO W/O SCOPE: CPT | Performed by: FAMILY MEDICINE

## 2024-09-05 PROCEDURE — 85652 RBC SED RATE AUTOMATED: CPT

## 2024-09-05 PROCEDURE — 85025 COMPLETE CBC W/AUTO DIFF WBC: CPT

## 2024-09-05 PROCEDURE — 86308 HETEROPHILE ANTIBODY SCREEN: CPT

## 2024-09-05 PROCEDURE — 36415 COLL VENOUS BLD VENIPUNCTURE: CPT

## 2024-09-05 PROCEDURE — 99214 OFFICE O/P EST MOD 30 MIN: CPT | Performed by: FAMILY MEDICINE

## 2024-09-05 RX ORDER — METHOCARBAMOL 500 MG/1
500 TABLET, FILM COATED ORAL NIGHTLY
Qty: 30 TABLET | Refills: 1 | Status: CANCELLED | OUTPATIENT
Start: 2024-09-05

## 2024-09-05 RX ORDER — METAXALONE 800 MG/1
800 TABLET ORAL 2 TIMES DAILY
Qty: 60 TABLET | Refills: 2 | Status: CANCELLED | OUTPATIENT
Start: 2024-09-05

## 2024-09-05 ASSESSMENT — ENCOUNTER SYMPTOMS
SINUS PRESSURE: 0
STRIDOR: 0
WEAKNESS: 1
CONFUSION: 0
FATIGUE: 1
SHORTNESS OF BREATH: 0
SPEECH DIFFICULTY: 0
RECTAL PAIN: 0
HEADACHES: 1
POLYDIPSIA: 0
SLEEP DISTURBANCE: 1
ABDOMINAL PAIN: 0
ACTIVITY CHANGE: 0
HEMATURIA: 0
FLANK PAIN: 0
COUGH: 0
NECK STIFFNESS: 0
PHOTOPHOBIA: 0
DECREASED CONCENTRATION: 0
PALPITATIONS: 0
DIARRHEA: 0
TROUBLE SWALLOWING: 0
DYSPHORIC MOOD: 0
ADENOPATHY: 0
MYALGIAS: 1
CHEST TIGHTNESS: 0
POLYPHAGIA: 0
APPETITE CHANGE: 0
AGITATION: 0
DIZZINESS: 0
ARTHRALGIAS: 0
ABDOMINAL DISTENTION: 0
PAIN: 1
BLOOD IN STOOL: 0
RHINORRHEA: 0
FEVER: 0
SORE THROAT: 1
COLOR CHANGE: 0
SINUS PAIN: 0
SEIZURES: 0
EYE PAIN: 1
CONSTIPATION: 0
NERVOUS/ANXIOUS: 0
DYSURIA: 0

## 2024-09-05 NOTE — PATIENT INSTRUCTIONS
Follow up in 7 days    Continue current medications and therapy for chronic medical conditions.    Patient was advised importance of proper diet/nutrition in addition adequate hydration. Patient was encouraged moderate exercise program to include 30 minutes daily for 5 days of the week or 150 minutes weekly. Patient will follow-up with us as scheduled.    Review ED records to include labs and diagnostic tests    Obtain ESR, CBC, and monospot labs    Start trazodone 50 mg nightly

## 2024-09-05 NOTE — PROGRESS NOTES
Subjective   Patient ID: Brii Reeves is a 43 y.o. female who presents for Pain.    Patient states visit  Urgent care on Tuesday 09/03/2024. Patient states they did Covid, flu and strep test and all was negative. Patient states she was diagnosed with some virus.     Patient states she had her period last week and used a old tampon. Patient is worried to have any intoxication (Toxic shock syndrome).    Patient states would like have a prescription for sleep.     Patient denies any other symptoms or concerns today.    Pain  This is a recurrent problem. Episode onset: Monday. The problem occurs constantly. The problem has been gradually worsening since onset. The context of the pain is unknown. Pain location: all body. The pain is severe. The symptoms are aggravated by any movement. Associated symptoms include eye pain, fatigue, headaches and weakness. Pertinent negatives include no abdominal pain, chest pain, constipation, diarrhea, dysuria, fever, rash, shortness of breath or urinary symptoms. (Sore throat ) Past treatments include OTC NSAID. The treatment provided no relief. There is no swelling present. She has been Less active and sleeping poorly.        Review of Systems   Constitutional:  Positive for fatigue. Negative for activity change, appetite change and fever.   HENT:  Positive for sore throat. Negative for congestion, dental problem, ear discharge, ear pain, mouth sores, rhinorrhea, sinus pressure, sinus pain, tinnitus and trouble swallowing.    Eyes:  Positive for pain. Negative for photophobia and visual disturbance.   Respiratory:  Negative for cough, chest tightness, shortness of breath and stridor.    Cardiovascular:  Negative for chest pain and palpitations.   Gastrointestinal:  Negative for abdominal distention, abdominal pain, blood in stool, constipation, diarrhea and rectal pain.   Endocrine: Negative for cold intolerance, heat intolerance, polydipsia, polyphagia and polyuria.  "  Genitourinary:  Negative for dysuria, flank pain, hematuria and urgency.   Musculoskeletal:  Positive for back pain and myalgias. Negative for arthralgias, gait problem and neck stiffness.   Skin:  Negative for color change and rash.   Allergic/Immunologic: Negative for environmental allergies and food allergies.   Neurological:  Positive for weakness and headaches. Negative for dizziness, seizures, syncope and speech difficulty.   Hematological:  Negative for adenopathy.   Psychiatric/Behavioral:  Positive for sleep disturbance. Negative for agitation, confusion, decreased concentration and dysphoric mood. The patient is not nervous/anxious.        Objective   /68 (BP Location: Right arm, Patient Position: Sitting, BP Cuff Size: Adult)   Pulse 110   Temp 36.8 °C (98.3 °F)   Resp 16   Ht 1.676 m (5' 6\")   Wt 58.9 kg (129 lb 12.8 oz)   SpO2 99%   BMI 20.95 kg/m²     Physical Exam  Vitals reviewed.   Constitutional:       General: She is not in acute distress.     Appearance: Normal appearance. She is normal weight. She is not ill-appearing or diaphoretic.   HENT:      Head: Normocephalic.      Right Ear: Tympanic membrane and external ear normal.      Left Ear: Tympanic membrane and external ear normal.      Nose: Nose normal. No congestion.      Mouth/Throat:      Pharynx: No posterior oropharyngeal erythema.   Eyes:      General:         Right eye: No discharge.         Left eye: No discharge.      Extraocular Movements: Extraocular movements intact.      Conjunctiva/sclera: Conjunctivae normal.      Pupils: Pupils are equal, round, and reactive to light.   Cardiovascular:      Rate and Rhythm: Normal rate and regular rhythm.      Pulses: Normal pulses.      Heart sounds: Normal heart sounds. No murmur heard.  Pulmonary:      Effort: Pulmonary effort is normal. No respiratory distress.      Breath sounds: Normal breath sounds. No wheezing or rales.   Chest:      Chest wall: No tenderness. "   Abdominal:      General: Abdomen is flat. Bowel sounds are normal. There is no distension.      Palpations: There is no mass.      Tenderness: There is no abdominal tenderness. There is no guarding.   Musculoskeletal:         General: Tenderness present. Normal range of motion.      Cervical back: Normal range of motion and neck supple. No tenderness.      Right lower leg: No edema.      Left lower leg: No edema.   Skin:     General: Skin is dry.      Coloration: Skin is not jaundiced.      Findings: No bruising, erythema or rash.   Neurological:      General: No focal deficit present.      Mental Status: She is alert and oriented to person, place, and time. Mental status is at baseline.      Cranial Nerves: No cranial nerve deficit.      Sensory: No sensory deficit.      Coordination: Coordination normal.      Gait: Gait normal.   Psychiatric:         Thought Content: Thought content normal.         Judgment: Judgment normal.      Comments: Anxious         Assessment/Plan   Problem List Items Addressed This Visit             ICD-10-CM    Cervical paraspinal muscle spasm M62.838    Strain of lumbar region - Primary S39.012A    Cervical strain S16.1XXA     Other Visit Diagnoses         Codes    Abnormal menstrual periods     N92.6    Relevant Orders    POCT Pregnancy, Urine manually resulted (Completed)    Urinary frequency     R35.0    Relevant Orders    POCT UA (nonautomated) manually resulted (Completed)    Pharyngitis due to Streptococcus species     J02.0    Relevant Orders    Sedimentation Rate (Completed)    CBC and Auto Differential (Completed)    Mononucleosis Screen (Completed)    Psychophysiological insomnia     F51.04          Scribe Attestation  By signing my name below, I, Gosia Mejias MA , Scribe   attest that this documentation has been prepared under the direction and in the presence of Jack Morton DO.    Provider Attestation - Scribe documentation    All medical record entries made by the  Scribe were at my direction and personally dictated by me. I have reviewed the chart and agree that the record accurately reflects my personal performance of the history, physical exam, discussion and plan.

## 2024-09-06 LAB
BASOPHILS # BLD AUTO: 0.03 X10*3/UL (ref 0–0.1)
BASOPHILS NFR BLD AUTO: 0.5 %
EOSINOPHIL # BLD AUTO: 0.06 X10*3/UL (ref 0–0.7)
EOSINOPHIL NFR BLD AUTO: 0.9 %
ERYTHROCYTE [DISTWIDTH] IN BLOOD BY AUTOMATED COUNT: 11.5 % (ref 11.5–14.5)
ERYTHROCYTE [SEDIMENTATION RATE] IN BLOOD BY WESTERGREN METHOD: 22 MM/H (ref 0–20)
HCT VFR BLD AUTO: 38.2 % (ref 36–46)
HETEROPH AB SERPLBLD QL IA.RAPID: NEGATIVE
HGB BLD-MCNC: 12.2 G/DL (ref 12–16)
IMM GRANULOCYTES # BLD AUTO: 0.02 X10*3/UL (ref 0–0.7)
IMM GRANULOCYTES NFR BLD AUTO: 0.3 % (ref 0–0.9)
LYMPHOCYTES # BLD AUTO: 1.96 X10*3/UL (ref 1.2–4.8)
LYMPHOCYTES NFR BLD AUTO: 29.5 %
MCH RBC QN AUTO: 29.3 PG (ref 26–34)
MCHC RBC AUTO-ENTMCNC: 31.9 G/DL (ref 32–36)
MCV RBC AUTO: 92 FL (ref 80–100)
MONOCYTES # BLD AUTO: 0.79 X10*3/UL (ref 0.1–1)
MONOCYTES NFR BLD AUTO: 11.9 %
NEUTROPHILS # BLD AUTO: 3.79 X10*3/UL (ref 1.2–7.7)
NEUTROPHILS NFR BLD AUTO: 56.9 %
NRBC BLD-RTO: 0 /100 WBCS (ref 0–0)
PLATELET # BLD AUTO: 161 X10*3/UL (ref 150–450)
RBC # BLD AUTO: 4.16 X10*6/UL (ref 4–5.2)
WBC # BLD AUTO: 6.7 X10*3/UL (ref 4.4–11.3)

## 2024-09-08 ASSESSMENT — ENCOUNTER SYMPTOMS: BACK PAIN: 1

## 2024-09-13 DIAGNOSIS — J01.40 ACUTE NON-RECURRENT PANSINUSITIS: ICD-10-CM

## 2024-09-13 RX ORDER — AZITHROMYCIN 250 MG/1
TABLET, FILM COATED ORAL
Qty: 6 TABLET | Refills: 0 | Status: SHIPPED | OUTPATIENT
Start: 2024-09-13 | End: 2024-09-18

## 2024-09-13 NOTE — TELEPHONE ENCOUNTER
Patient of Dr. Morton    Patient was  seen on 09/05/2024. Patient is still not feeling better sore throat and ear pain. Same thing she went to the office. She tried otc stuff and nothing is helping     She is asking for possible antibiotic to be sent to pharmacy  on [facility] in [place]

## 2024-09-24 ENCOUNTER — HOSPITAL ENCOUNTER (OUTPATIENT)
Dept: PAIN MEDICINE | Facility: CLINIC | Age: 44
Discharge: HOME | End: 2024-09-24
Payer: MEDICAID

## 2024-09-24 VITALS
OXYGEN SATURATION: 94 % | TEMPERATURE: 96.2 F | DIASTOLIC BLOOD PRESSURE: 94 MMHG | RESPIRATION RATE: 20 BRPM | HEART RATE: 74 BPM | SYSTOLIC BLOOD PRESSURE: 137 MMHG

## 2024-09-24 DIAGNOSIS — M47.812 SPONDYLOSIS WITHOUT MYELOPATHY OR RADICULOPATHY, CERVICAL REGION: ICD-10-CM

## 2024-09-24 DIAGNOSIS — J30.1 SEASONAL ALLERGIC RHINITIS DUE TO POLLEN: ICD-10-CM

## 2024-09-24 PROCEDURE — 64492 INJ PARAVERT F JNT C/T 3 LEV: CPT | Performed by: PAIN MEDICINE

## 2024-09-24 PROCEDURE — 64491 INJ PARAVERT F JNT C/T 2 LEV: CPT | Performed by: PAIN MEDICINE

## 2024-09-24 PROCEDURE — 64490 INJ PARAVERT F JNT C/T 1 LEV: CPT | Performed by: PAIN MEDICINE

## 2024-09-24 PROCEDURE — 2500000005 HC RX 250 GENERAL PHARMACY W/O HCPCS: Performed by: PAIN MEDICINE

## 2024-09-24 RX ORDER — LIDOCAINE HYDROCHLORIDE 20 MG/ML
INJECTION, SOLUTION EPIDURAL; INFILTRATION; INTRACAUDAL; PERINEURAL AS NEEDED
Status: COMPLETED | OUTPATIENT
Start: 2024-09-24 | End: 2024-09-24

## 2024-09-24 RX ORDER — FLUTICASONE PROPIONATE 50 MCG
1 SPRAY, SUSPENSION (ML) NASAL 2 TIMES DAILY
Qty: 16 G | Refills: 1 | Status: SHIPPED | OUTPATIENT
Start: 2024-09-24

## 2024-09-24 RX ORDER — LIDOCAINE HYDROCHLORIDE 10 MG/ML
INJECTION, SOLUTION EPIDURAL; INFILTRATION; INTRACAUDAL; PERINEURAL AS NEEDED
Status: COMPLETED | OUTPATIENT
Start: 2024-09-24 | End: 2024-09-24

## 2024-09-24 ASSESSMENT — PAIN - FUNCTIONAL ASSESSMENT: PAIN_FUNCTIONAL_ASSESSMENT: 0-10

## 2024-09-24 ASSESSMENT — PAIN SCALES - GENERAL
PAINLEVEL_OUTOF10: 3
PAINLEVEL_OUTOF10: 2

## 2024-09-24 NOTE — DISCHARGE INSTRUCTIONS
Post-injection instructions FOR FACET BLOCK      Pay attention to how much pain relief (what percentage compared to before the procedure) you get and for how long it lasts.     THIS IS A TEMPORARY NUMBING OF PAIN THIS IS BEING USED AS A DIAGNOSTIC INDICATOR IF THIS IS THE SOURCE OF YOUR PAIN    Activity:  RETURN TO NORMAL ACTIVITY  SEE IF YOU CAN APPRECIATE AN IMPROVEMENT IN THE PAIN    Bandages: Remove after 24 hours     Showering/Bathing: You may shower after bandage is removed     Follow up: CALL OFFICE NEXT BUSINESS -[phone] LEAVE MESSAGE ABOUT THE % OF RELIEF THAT WAS OBTAINED AND FOR HOW MANY HOURS      Call the OFFICE immediately: if you notice:     Excessive bleeding from procedure site (brisk bright red bleeding from the site or bleeding that soaks the bandages or does not stop)   Severe headache  Inability to walk, leg or arm weakness or numbness that is worse after the procedure   Uncontrolled pain   New urinary or fecal incontinence   Signs of infection: Fever above 101.5F, redness, swelling, pus or drainage from the site

## 2024-10-17 ENCOUNTER — APPOINTMENT (OUTPATIENT)
Dept: PRIMARY CARE | Facility: CLINIC | Age: 44
End: 2024-10-17
Payer: MEDICAID

## 2024-10-17 VITALS
HEART RATE: 99 BPM | SYSTOLIC BLOOD PRESSURE: 116 MMHG | WEIGHT: 129.8 LBS | OXYGEN SATURATION: 99 % | TEMPERATURE: 98.3 F | DIASTOLIC BLOOD PRESSURE: 70 MMHG | BODY MASS INDEX: 20.86 KG/M2 | HEIGHT: 66 IN | RESPIRATION RATE: 16 BRPM

## 2024-10-17 DIAGNOSIS — S16.1XXS STRAIN OF NECK MUSCLE, SEQUELA: ICD-10-CM

## 2024-10-17 DIAGNOSIS — M62.838 CERVICAL PARASPINAL MUSCLE SPASM: ICD-10-CM

## 2024-10-17 DIAGNOSIS — J30.1 SEASONAL ALLERGIC RHINITIS DUE TO POLLEN: ICD-10-CM

## 2024-10-17 DIAGNOSIS — S39.012S STRAIN OF LUMBAR REGION, SEQUELA: ICD-10-CM

## 2024-10-17 DIAGNOSIS — M54.2 NECK PAIN: ICD-10-CM

## 2024-10-17 DIAGNOSIS — S06.0XAS CONCUSSION WITH UNKNOWN LOSS OF CONSCIOUSNESS STATUS, SEQUELA (CMS-HCC): ICD-10-CM

## 2024-10-17 DIAGNOSIS — J01.10 ACUTE FRONTAL SINUSITIS, RECURRENCE NOT SPECIFIED: ICD-10-CM

## 2024-10-17 DIAGNOSIS — M54.12 CERVICAL RADICULOPATHY: ICD-10-CM

## 2024-10-17 PROCEDURE — 99214 OFFICE O/P EST MOD 30 MIN: CPT | Performed by: FAMILY MEDICINE

## 2024-10-17 RX ORDER — METHOCARBAMOL 500 MG/1
500 TABLET, FILM COATED ORAL NIGHTLY
Qty: 30 TABLET | Refills: 3 | Status: SHIPPED | OUTPATIENT
Start: 2024-10-17

## 2024-10-17 RX ORDER — TIOCONAZOLE 6.5 %
1 OINTMENT WITH PREFILLED APPLICATOR VAGINAL DAILY
Qty: 90 TABLET | Refills: 1 | Status: SHIPPED | OUTPATIENT
Start: 2024-10-17

## 2024-10-17 RX ORDER — FLUTICASONE PROPIONATE 50 MCG
1 SPRAY, SUSPENSION (ML) NASAL 2 TIMES DAILY
Qty: 16 G | Refills: 3 | Status: SHIPPED | OUTPATIENT
Start: 2024-10-17

## 2024-10-17 RX ORDER — PREDNISONE 10 MG/1
TABLET ORAL
Qty: 20 TABLET | Refills: 0 | Status: SHIPPED | OUTPATIENT
Start: 2024-10-17

## 2024-10-17 ASSESSMENT — ENCOUNTER SYMPTOMS: DEPRESSION: 0
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
## 2024-10-22 ENCOUNTER — APPOINTMENT (OUTPATIENT)
Dept: PHYSICAL THERAPY | Facility: CLINIC | Age: 44
End: 2024-10-22
Payer: MEDICAID

## 2024-10-22 ENCOUNTER — EVALUATION (OUTPATIENT)
Dept: PHYSICAL THERAPY | Facility: CLINIC | Age: 44
End: 2024-10-22
Payer: MEDICAID

## 2024-10-22 DIAGNOSIS — M54.12 CERVICAL RADICULOPATHY: ICD-10-CM

## 2024-10-22 DIAGNOSIS — S39.012S STRAIN OF LUMBAR REGION, SEQUELA: ICD-10-CM

## 2024-10-22 DIAGNOSIS — M54.2 NECK PAIN: ICD-10-CM

## 2024-10-22 DIAGNOSIS — S16.1XXS STRAIN OF NECK MUSCLE, SEQUELA: ICD-10-CM

## 2024-10-22 DIAGNOSIS — M62.838 CERVICAL PARASPINAL MUSCLE SPASM: ICD-10-CM

## 2024-10-22 PROCEDURE — 97163 PT EVAL HIGH COMPLEX 45 MIN: CPT | Mod: GP
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
## 2024-10-29 ENCOUNTER — APPOINTMENT (OUTPATIENT)
Dept: ORTHOPEDIC SURGERY | Facility: CLINIC | Age: 44
End: 2024-10-29
Payer: MEDICAID

## 2024-10-30 ENCOUNTER — TREATMENT (OUTPATIENT)
Dept: PHYSICAL THERAPY | Facility: CLINIC | Age: 44
End: 2024-10-30
Payer: MEDICAID

## 2024-10-30 DIAGNOSIS — M62.838 CERVICAL PARASPINAL MUSCLE SPASM: ICD-10-CM

## 2024-10-30 DIAGNOSIS — M54.2 NECK PAIN: ICD-10-CM

## 2024-10-30 DIAGNOSIS — M54.12 CERVICAL RADICULOPATHY: ICD-10-CM

## 2024-10-30 PROCEDURE — 97113 AQUATIC THERAPY/EXERCISES: CPT | Mod: GP,CQ

## 2024-11-01 ENCOUNTER — TELEPHONE (OUTPATIENT)
Dept: PRIMARY CARE | Facility: CLINIC | Age: 44
End: 2024-11-01
Payer: MEDICAID

## 2024-11-01 DIAGNOSIS — S39.012S STRAIN OF LUMBAR REGION, SEQUELA: ICD-10-CM

## 2024-11-01 DIAGNOSIS — S16.1XXS STRAIN OF NECK MUSCLE, SEQUELA: ICD-10-CM

## 2024-11-01 DIAGNOSIS — M62.838 CERVICAL PARASPINAL MUSCLE SPASM: ICD-10-CM

## 2024-11-01 RX ORDER — METAXALONE 800 MG/1
800 TABLET ORAL 2 TIMES DAILY
Qty: 60 TABLET | Refills: 2 | Status: SHIPPED | OUTPATIENT
Start: 2024-11-01

## 2024-11-06 ENCOUNTER — TREATMENT (OUTPATIENT)
Dept: PHYSICAL THERAPY | Facility: CLINIC | Age: 44
End: 2024-11-06
Payer: MEDICAID

## 2024-11-06 DIAGNOSIS — M62.838 CERVICAL PARASPINAL MUSCLE SPASM: ICD-10-CM

## 2024-11-06 DIAGNOSIS — M54.12 CERVICAL RADICULOPATHY: ICD-10-CM

## 2024-11-06 DIAGNOSIS — M54.2 NECK PAIN: ICD-10-CM

## 2024-11-06 PROCEDURE — 97113 AQUATIC THERAPY/EXERCISES: CPT | Mod: GP,CQ

## 2024-11-06 NOTE — PROGRESS NOTES
Patient Name: Brii Reeves  MRN: [id number]  Today's Date: 11/6/2024  Time Calculation  Start Time: 0833  Stop Time: 0905  Time Calculation (min): 32 min  Insurance   #3     Subjective:  Reports that she had slight increased DOMS with initial aquatic session but is hopeful that aquatics is going to help increase endurance.    Objective:  Fair - DNF  wall support with UE movements     Assessment:   Pt requires cues for proper cervical positioning with UE movements.     Plan:   Progress as able     Treatment :   AQUATIC THERAPEUTIC EXERCISE 39546 30 minutes /2 minutes independent   Aquatic gait forward/Bwd/Lateral 3laps with UE involvement   March 3 laps   Pec /lat stretch   HS/GS Stretches NV   Hip abduction 2x10   Squats x20 NV   DLS 3 way water resistance only 10 x each   Bicep /tricep level 1 paddle x10 each   B shld external rotation level level 5   UE  Rotation with noodle post post shld stretching x10   Post shld stretching UE assist 10 seconds x3   Noodle Pull down elbows bent x10   Ladder plank push up with DNF  x15  Cervical ROM   Rows level 1 paddle x10 NV   Supine float on noodle independent   Supine float with cycle independent            Current Problem:  1. Cervical paraspinal muscle spasm  Follow Up In Physical Therapy      2. Neck pain  Follow Up In Physical Therapy      3. Cervical radiculopathy  Follow Up In Physical Therapy

## 2024-11-11 ENCOUNTER — TREATMENT (OUTPATIENT)
Dept: PHYSICAL THERAPY | Facility: CLINIC | Age: 44
End: 2024-11-11
Payer: MEDICAID

## 2024-11-11 DIAGNOSIS — M54.2 NECK PAIN: ICD-10-CM

## 2024-11-11 DIAGNOSIS — M62.838 CERVICAL PARASPINAL MUSCLE SPASM: ICD-10-CM

## 2024-11-11 DIAGNOSIS — M54.12 CERVICAL RADICULOPATHY: ICD-10-CM

## 2024-11-11 PROCEDURE — 97113 AQUATIC THERAPY/EXERCISES: CPT | Mod: GP,CQ

## 2024-11-11 NOTE — PROGRESS NOTES
Patient Name: Brii Reeves  MRN: [id number]  Today's Date: 11/11/2024  Time Calculation  Start Time: 0203  Stop Time: 0243  Time Calculation (min): 40 min  Insurance   #4     Subjective:  Reports increased soreness post last session.      Objective:  Water resistance only with UE exercises     Assessment:   Decreased resistance with UE movements today to assess response due to reports of increased soreness with last treatment. Overall improved cervical stabilization and postural awareness noted with exercise performance. No   re correction on proper form post initial instruction.     Plan:   Ask response with today's modification.     Treatment :   AQUATIC THERAPEUTIC EXERCISE 03942 40 minutes   Aquatic gait forward/Bwd/Lateral 5 laps with UE involvement   March 3 laps   Pec /lat stretch   HS/GS Stretches NV   Hip abduction 2x10   Squats x20 NV   DLS 3 way water resistance only 10 x each   Bicep /tricep level water resistance only  x10 each   B shld external rotation water resistance only x10   UE  Rotation with noodle /post shld stretching x10   Noodle Pull down elbows straight  x10   Ladder plank push up with DNF  x15 NV   Cervical ROM   Rows level 1 paddle x10 NV   UE alternating chantelle cross x10             Current Problem:  1. Cervical paraspinal muscle spasm  Follow Up In Physical Therapy      2. Neck pain  Follow Up In Physical Therapy      3. Cervical radiculopathy  Follow Up In Physical Therapy

## 2024-11-18 ENCOUNTER — TREATMENT (OUTPATIENT)
Dept: PHYSICAL THERAPY | Facility: CLINIC | Age: 44
End: 2024-11-18
Payer: MEDICAID

## 2024-11-18 DIAGNOSIS — M62.838 CERVICAL PARASPINAL MUSCLE SPASM: ICD-10-CM

## 2024-11-18 DIAGNOSIS — M54.12 CERVICAL RADICULOPATHY: ICD-10-CM

## 2024-11-18 DIAGNOSIS — M54.2 NECK PAIN: ICD-10-CM

## 2024-11-18 PROCEDURE — 97113 AQUATIC THERAPY/EXERCISES: CPT | Mod: GP,CQ
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
## 2024-11-22 ENCOUNTER — HOSPITAL ENCOUNTER (OUTPATIENT)
Dept: RADIOLOGY | Facility: CLINIC | Age: 44
Discharge: HOME | End: 2024-11-22
Payer: MEDICAID

## 2024-11-22 ENCOUNTER — OFFICE VISIT (OUTPATIENT)
Dept: ORTHOPEDIC SURGERY | Facility: CLINIC | Age: 44
End: 2024-11-22
Payer: MEDICAID

## 2024-11-22 VITALS — BODY MASS INDEX: 21.33 KG/M2 | HEIGHT: 65 IN | WEIGHT: 128 LBS

## 2024-11-22 DIAGNOSIS — M54.2 NECK PAIN: Primary | ICD-10-CM

## 2024-11-22 DIAGNOSIS — M54.12 CERVICAL RADICULOPATHY: ICD-10-CM

## 2024-11-22 DIAGNOSIS — M54.2 NECK PAIN: ICD-10-CM

## 2024-11-22 DIAGNOSIS — M62.838 CERVICAL PARASPINAL MUSCLE SPASM: ICD-10-CM

## 2024-11-22 DIAGNOSIS — S06.0XAS CONCUSSION WITH UNKNOWN LOSS OF CONSCIOUSNESS STATUS, SEQUELA (CMS-HCC): ICD-10-CM

## 2024-11-22 PROCEDURE — 72050 X-RAY EXAM NECK SPINE 4/5VWS: CPT

## 2024-11-22 PROCEDURE — 99214 OFFICE O/P EST MOD 30 MIN: CPT | Performed by: ORTHOPAEDIC SURGERY

## 2024-11-22 ASSESSMENT — PAIN SCALES - GENERAL: PAINLEVEL_OUTOF10: 3

## 2024-11-22 ASSESSMENT — PAIN - FUNCTIONAL ASSESSMENT: PAIN_FUNCTIONAL_ASSESSMENT: 0-10

## 2024-11-25 ENCOUNTER — TREATMENT (OUTPATIENT)
Dept: PHYSICAL THERAPY | Facility: CLINIC | Age: 44
End: 2024-11-25
Payer: MEDICAID

## 2024-11-25 DIAGNOSIS — M54.12 CERVICAL RADICULOPATHY: ICD-10-CM

## 2024-11-25 DIAGNOSIS — M54.2 NECK PAIN: ICD-10-CM

## 2024-11-25 DIAGNOSIS — M62.838 CERVICAL PARASPINAL MUSCLE SPASM: ICD-10-CM

## 2024-11-25 PROCEDURE — 97113 AQUATIC THERAPY/EXERCISES: CPT | Mod: GP,CQ

## 2024-11-25 NOTE — PROGRESS NOTES
Patient Name: Brii Reeves  MRN: [id number]  Today's Date: 11/25/2024  Time Calculation  Start Time: 0205  Stop Time: 0245  Time Calculation (min): 40 min  Insurance   #6  Subjective:  States that she had consult with cervical specialist  who ordered MRI of cervical spine . See recent note for complete details. Pt continues to report limited overall functional ability  to complete everyday tasks. Reports difficulty with ambulating up stairs with SOB and increased cervical pain and H/A . Paces activities with ADLS to avoid increased symptoms.  Pt reports hypersensitivity to cervical spine and muscluature . Unable to wear clothing around neck or jewelry on neck to due to  heightened sensation .    Objective:  Paddle resistance level 1 with UE movements     Assessment:   Pt continues to present with decreased endurance with exercises however she demos overall improved postural control against water resistant requiring decreased cues on proper form.     Plan:   Monitor symptoms     Treatment :   AQUATIC THERAPEUTIC EXERCISE 09424 205-235 one on one  ,235-245 independent   Aquatic gait forward/Bwd/Lateral 3laps with UE involvement   March 3 laps   Pec /lat stretch  2x30  HS/GS Stretches NV   Hip abduction 2x10   Squats x20 NV   DLS 3 way  level 1 10 x each   Bicep /tricep level 1 paddle x10 each  wall support   B shld external rotation level level 5  NV   UE  Rotation with noodle post post shld stretching x10   Post shld stretching UE assist 10 seconds x3   Noodle Pull down elbows bent x10   Ladder plank push up with DNF  x15 NV   Cervical ROM   Rows level 1 paddle x10                                Current Problem:  1. Cervical paraspinal muscle spasm  Follow Up In Physical Therapy      2. Neck pain  Follow Up In Physical Therapy      3. Cervical radiculopathy  Follow Up In Physical Therapy

## 2024-12-02 ENCOUNTER — TREATMENT (OUTPATIENT)
Dept: PHYSICAL THERAPY | Facility: CLINIC | Age: 44
End: 2024-12-02
Payer: MEDICAID

## 2024-12-02 DIAGNOSIS — M62.838 CERVICAL PARASPINAL MUSCLE SPASM: ICD-10-CM

## 2024-12-02 DIAGNOSIS — M54.2 NECK PAIN: ICD-10-CM

## 2024-12-02 DIAGNOSIS — M54.12 CERVICAL RADICULOPATHY: ICD-10-CM

## 2024-12-02 PROCEDURE — 97113 AQUATIC THERAPY/EXERCISES: CPT | Mod: GP,CQ

## 2024-12-02 NOTE — PROGRESS NOTES
Patient Name: Brii Revees  MRN: [id number]  Today's Date: 12/2/2024  Time Calculation  Start Time: 0210  Stop Time: 0240  Time Calculation (min): 30 min  Insurance   #7  Subjective:  States that overall pain is a little less due to having decreased work due to holiday.    Objective:  Good cervical stability with UE involvement      Assessment:   Pt demos improved cervical endurance with completion of exercises . Progressed EFS to include LE /core stabilization exercises to increase overall functional endurance .Cues for improved thoracic postioning and core activation with progression of exercises .     Plan:   Ask response with progression of endurance activities that were progressed with todays session.    Treatment :   Aquatic gait forward/Bwd/Lateral 3laps with UE involvement   March 3 laps   Pec /lat stretch  2x30  HS/GS Stretches NV   Hip abduction 2x10   Hip flex/ext x10  Squats x10   DLS 3 way  level 1 10 x each wall support   Bicep /tricep level 1 paddle x10 each  wall support   B shld external rotation level level 5  NV   UE  Rotation with noodle post post shld stretching x10   Post shld stretching UE assist 10 seconds x3   Noodle Pull down elbows bent x10   Ladder plank push up with DNF  x15 NV   Cervical ROM   Rows level 1 paddle x10   Hamstring curls x10  Hip circles x10           Current Problem:  1. Cervical paraspinal muscle spasm  Follow Up In Physical Therapy      2. Neck pain  Follow Up In Physical Therapy      3. Cervical radiculopathy  Follow Up In Physical Therapy

## 2024-12-09 ENCOUNTER — APPOINTMENT (OUTPATIENT)
Dept: PHYSICAL THERAPY | Facility: CLINIC | Age: 44
End: 2024-12-09
Payer: MEDICAID

## 2024-12-16 ENCOUNTER — APPOINTMENT (OUTPATIENT)
Dept: PHYSICAL THERAPY | Facility: CLINIC | Age: 44
End: 2024-12-16
Payer: MEDICAID

## 2024-12-30 ENCOUNTER — TREATMENT (OUTPATIENT)
Dept: PHYSICAL THERAPY | Facility: CLINIC | Age: 44
End: 2024-12-30
Payer: MEDICAID

## 2024-12-30 DIAGNOSIS — M62.838 CERVICAL PARASPINAL MUSCLE SPASM: ICD-10-CM

## 2024-12-30 DIAGNOSIS — M54.2 NECK PAIN: ICD-10-CM

## 2024-12-30 DIAGNOSIS — M54.12 CERVICAL RADICULOPATHY: ICD-10-CM

## 2024-12-30 PROCEDURE — 97164 PT RE-EVAL EST PLAN CARE: CPT | Mod: GP

## 2024-12-30 PROCEDURE — 97110 THERAPEUTIC EXERCISES: CPT | Mod: GP
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
## 2025-01-15 ENCOUNTER — TREATMENT (OUTPATIENT)
Dept: PHYSICAL THERAPY | Facility: CLINIC | Age: 45
End: 2025-01-15
Payer: MEDICAID

## 2025-01-15 DIAGNOSIS — M54.2 NECK PAIN: ICD-10-CM

## 2025-01-15 DIAGNOSIS — M62.838 CERVICAL PARASPINAL MUSCLE SPASM: ICD-10-CM

## 2025-01-15 DIAGNOSIS — M54.12 CERVICAL RADICULOPATHY: ICD-10-CM

## 2025-01-15 PROCEDURE — 97110 THERAPEUTIC EXERCISES: CPT | Mod: GP
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
## 2025-01-29 ENCOUNTER — APPOINTMENT (OUTPATIENT)
Dept: PHYSICAL THERAPY | Facility: CLINIC | Age: 45
End: 2025-01-29
Payer: MEDICAID

## 2025-02-03 ENCOUNTER — TREATMENT (OUTPATIENT)
Dept: PHYSICAL THERAPY | Facility: CLINIC | Age: 45
End: 2025-02-03
Payer: MEDICAID

## 2025-02-03 DIAGNOSIS — S16.1XXS STRAIN OF NECK MUSCLE, SEQUELA: ICD-10-CM

## 2025-02-03 DIAGNOSIS — M54.2 NECK PAIN: ICD-10-CM

## 2025-02-03 DIAGNOSIS — M62.838 CERVICAL PARASPINAL MUSCLE SPASM: Primary | ICD-10-CM

## 2025-02-03 DIAGNOSIS — M54.12 CERVICAL RADICULOPATHY: ICD-10-CM

## 2025-02-03 PROCEDURE — 97110 THERAPEUTIC EXERCISES: CPT | Mod: GP

## 2025-02-03 NOTE — PROGRESS NOTES
"Physical Therapy    Physical Therapy Treatment    Patient Name: Brii Reeves  MRN: [id number]  Today's Date: 2/4/2025  Time Calculation  Start Time: 1611  Stop Time: 1650  Time Calculation (min): 39 min        Insurance:  Visit: #10  Authorized: 23  Certification: 10/29/2024 - 10/29/2025   Payor: [facility] RUBÉN / Plan: [facility] PLAN / Product Type: *No Product type* /     Subjective:  Patient reports to physical therapy with no major improvements in symptoms since the previous session. She remains compliant with most recent HEP, noting no worsening in headache related symptoms last session or post completion of interventions. Patient continues to remain discouraged by lack of noticeable improvement in progression but recognizes the need to continue participating in therapeutic interventions to reduce potential for worsening loss of function.         Current pain: 5/10; Range: 4-8/10      Objective:  Right rotation (c/s)  = 80 degrees with discomfort on the right side   No pain with full rotation post rotation MET     Left rotation (c/s) = 75 degrees with pain on the left side   Post assessment shows full range of motion (90 degrees) with discomfort located to the left lower cervical spine         Treatment:  Therapeutic Exercise = 3 units  Seated cervical rotation progressive isometrics = 2x5 each with 5 second hold at each position   Manual cervical retraction = 15x holding for 3 seconds each time   Supine cervical retractions = 5x10\" holds   Performed 2x each side with single arm overhead shoulder flexion   Prone scapular retractions with cervical retraction = 5x10\" holds           Diagnosis:  1. Cervical paraspinal muscle spasm    2. Strain of neck muscle, sequela    3. Neck pain            Assessment:   Pt initiated at start of session with progressive isometrics into rotation as a way to emphasize specific cervical musculature strengthening and tolerance into available " "end-ranges of motion. Comparing pre to post there is no discomfort in right rotation by the final set, however left rotation continued to present with discomfort in the lower cervical spine likely from muscle tension. By end of session patient reports that she can \"feel we worked my neck today\" and requests to withhold performing a second set of prone scapular retractions with cervical retractions. Continue to monitor patient response to interventions in session and modify as appropriate.         Plan:  Continue POC as tolerated.       Jerrod Christensen, PT  "

## 2025-02-07 DIAGNOSIS — M62.838 CERVICAL PARASPINAL MUSCLE SPASM: ICD-10-CM

## 2025-02-07 DIAGNOSIS — S16.1XXS STRAIN OF NECK MUSCLE, SEQUELA: ICD-10-CM

## 2025-02-07 DIAGNOSIS — S39.012S STRAIN OF LUMBAR REGION, SEQUELA: ICD-10-CM

## 2025-02-07 DIAGNOSIS — B35.1 FUNGAL NAIL INFECTION: ICD-10-CM

## 2025-02-07 NOTE — TELEPHONE ENCOUNTER
Dr. Mcdonald patient  Refill for metaxalone (Skelaxin) 800 mg tablet  [facility] in [place] on [address]     Also, patient thinks that she has a fungal infection on her nail and would like to know if he would prescribe her a topical. Says he has prescribed this in the past and was given Ciclopirox.    Please advise, thank you.

## 2025-02-10 ENCOUNTER — APPOINTMENT (OUTPATIENT)
Dept: PHYSICAL THERAPY | Facility: CLINIC | Age: 45
End: 2025-02-10
Payer: MEDICAID

## 2025-02-10 RX ORDER — METAXALONE 800 MG/1
TABLET ORAL
Qty: 30 TABLET | Refills: 0 | Status: SHIPPED | OUTPATIENT
Start: 2025-02-10

## 2025-02-10 RX ORDER — CICLOPIROX 80 MG/ML
SOLUTION TOPICAL NIGHTLY
Qty: 6.6 ML | Refills: 1 | Status: SHIPPED | OUTPATIENT
Start: 2025-02-10

## 2025-02-10 NOTE — TELEPHONE ENCOUNTER
Recent Visits  Date Type Provider Dept   10/17/24 Office Visit Jose Mcdonald, DO [facility]   09/05/24 Office Visit Jack Morton, DO [facility]   Showing recent visits within past 180 days and meeting all other requirements  Future Appointments  No visits were found meeting these conditions.  Showing future appointments within next 90 days and meeting all other requirements

## 2025-02-17 ENCOUNTER — APPOINTMENT (OUTPATIENT)
Dept: PHYSICAL THERAPY | Facility: CLINIC | Age: 45
End: 2025-02-17
Payer: MEDICAID

## 2025-03-03 ENCOUNTER — TREATMENT (OUTPATIENT)
Dept: PHYSICAL THERAPY | Facility: CLINIC | Age: 45
End: 2025-03-03
Payer: MEDICAID

## 2025-03-03 DIAGNOSIS — M54.12 CERVICAL RADICULOPATHY: ICD-10-CM

## 2025-03-03 DIAGNOSIS — M54.2 NECK PAIN: ICD-10-CM

## 2025-03-03 PROCEDURE — 97110 THERAPEUTIC EXERCISES: CPT | Mod: GP

## 2025-03-03 PROCEDURE — 97112 NEUROMUSCULAR REEDUCATION: CPT | Mod: GP
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
## 2025-03-04 DIAGNOSIS — J30.1 SEASONAL ALLERGIC RHINITIS DUE TO POLLEN: ICD-10-CM

## 2025-03-04 RX ORDER — FLUTICASONE PROPIONATE 50 MCG
1 SPRAY, SUSPENSION (ML) NASAL 2 TIMES DAILY
Qty: 16 G | Refills: 3 | Status: SHIPPED | OUTPATIENT
Start: 2025-03-04

## 2025-03-07 ENCOUNTER — TELEPHONE (OUTPATIENT)
Dept: ORTHOPEDIC SURGERY | Facility: CLINIC | Age: 45
End: 2025-03-07
Payer: MEDICAID

## 2025-03-07 DIAGNOSIS — M62.838 CERVICAL PARASPINAL MUSCLE SPASM: ICD-10-CM

## 2025-03-07 DIAGNOSIS — M54.12 CERVICAL RADICULOPATHY: ICD-10-CM

## 2025-03-07 NOTE — TELEPHONE ENCOUNTER
Patient THELMAM requesting a new MRI order and PT order be put in as the ones she had were . I looked in her chart and the PT order was in fact  but the MRI was still current so I put a new PT order in patients chart. I then called patient and THELMAM with this information so she is aware.

## 2025-03-10 ENCOUNTER — TREATMENT (OUTPATIENT)
Dept: PHYSICAL THERAPY | Facility: CLINIC | Age: 45
End: 2025-03-10
Payer: MEDICAID

## 2025-03-10 DIAGNOSIS — M54.12 CERVICAL RADICULOPATHY: ICD-10-CM

## 2025-03-10 DIAGNOSIS — M54.2 NECK PAIN: ICD-10-CM

## 2025-03-10 PROCEDURE — 97113 AQUATIC THERAPY/EXERCISES: CPT | Mod: GP,CQ
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
## 2025-04-09 ENCOUNTER — TREATMENT (OUTPATIENT)
Dept: PHYSICAL THERAPY | Facility: CLINIC | Age: 45
End: 2025-04-09
Payer: MEDICAID

## 2025-04-09 DIAGNOSIS — M54.12 CERVICAL RADICULOPATHY: ICD-10-CM

## 2025-04-09 DIAGNOSIS — M54.2 NECK PAIN: ICD-10-CM

## 2025-04-09 PROCEDURE — 97113 AQUATIC THERAPY/EXERCISES: CPT | Mod: GP,CQ
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
## 2025-04-14 ENCOUNTER — TREATMENT (OUTPATIENT)
Dept: PHYSICAL THERAPY | Facility: CLINIC | Age: 45
End: 2025-04-14
Payer: MEDICAID

## 2025-04-14 DIAGNOSIS — M54.12 CERVICAL RADICULOPATHY: ICD-10-CM

## 2025-04-14 DIAGNOSIS — M54.2 NECK PAIN: ICD-10-CM

## 2025-04-14 PROCEDURE — 97113 AQUATIC THERAPY/EXERCISES: CPT | Mod: GP,CQ

## 2025-04-14 NOTE — PROGRESS NOTES
Patient Name: Brii Reeves  MRN: [id number]  Today's Date: 4/14/2025  Time Calculation  Start Time: 1440  Stop Time: 1510  Time Calculation (min): 30 min  Insurance:  Visit: #13  Authorized: 23  (4 -of 12 on new auth )   *2025 // AUTH FOR 12 VISITS, 1/31-5/1/25 // lumbar, neck // 0% coins, no ded/oop/copay, 30v jeannette yr (1v used as of 1/15/2025), ASHER albert,      Payor: [email or web address] RUBÉN / Plan: [email or web address] PLAN / Product Type: *No Product type* /      Subjective:  Reports that overall symptoms remain unchanged  but aquatics allows her to perform exercises without increasing symptoms.    Objective:  Rounded shld posture     Assessment:   Pt was able to demo improved ability and awareness to maintain proper cervical DNF . Cues for initial proper positing still needed.   Plan:   Cont with increasing postural stability and endurance     Treatment :   AQUATIC THERAPEUTIC EXERCISE 32607   Aquatic gait forward/Bwd/Lateral 3laps with UE involvement   March 3 laps   Pec /lat stretch  2x30  HS/GS Stretches NV   Hip abduction 2x10   Hip flex/ext x10  Squats x10   DLS 3 way  level 1 10 x each wall support   Bicep /tricep level 1 paddle x10 each  wall support   B shld external rotation level level 5  NV   UE  Rotation with noodle post post shld stretching x10   Post shld stretching UE assist 10 seconds x3   Noodle Pull down elbows bent x10   Ladder plank push up with DNF  x15 NV   Cervical ROM   Rows level 1 paddle x10   Hamstring curls x10  Hip circles x10   Noodle cycle 2 minutes                  Current Problem:  1. Neck pain  Follow Up In Physical Therapy      2. Cervical radiculopathy  Follow Up In Physical Therapy

## 2025-04-21 ENCOUNTER — TREATMENT (OUTPATIENT)
Dept: PHYSICAL THERAPY | Facility: CLINIC | Age: 45
End: 2025-04-21
Payer: MEDICAID

## 2025-04-21 DIAGNOSIS — M54.12 CERVICAL RADICULOPATHY: ICD-10-CM

## 2025-04-21 DIAGNOSIS — M54.2 NECK PAIN: ICD-10-CM

## 2025-04-21 PROCEDURE — 97113 AQUATIC THERAPY/EXERCISES: CPT | Mod: GP,CQ

## 2025-04-21 NOTE — PROGRESS NOTES
Patient Name: Brii Reeves  MRN: [id number]  Today's Date: 4/21/2025  Time Calculation  Start Time: 1435  Stop Time: 1515  Time Calculation (min): 40 min  Insurance:  Visit: #14  Authorized: 23  (5 of 12 on new auth )   *2025 // AUTH FOR 12 VISITS, 1/31-5/1/25 // lumbar, neck // 0% coins, no ded/oop/copay, 30v jeannette yr (1v used as of 1/15/2025), ASHER albert,      Payor: [employer] RUBÉN / Plan: [employer] PLAN / Product Type: *No Product type* /     Subjective:  States that her average daily pain is 2/10 . Increased activity due to holiday weekend in which has increased pain levels today  4/10. Reports that she feels that she is improving however duration of progress was not what she was expecting.     Objective:  Fair + DNF with paddle level 5     Assessment:   Pt demonstrates improved overall cervical/thoracic  stability and  exercise endurance.     Plan:   Selina SONG . Insurance approval  date expires on 5/1/2025 .     Treatment :   AQUATIC THERAPEUTIC EXERCISE 45590   Aquatic gait forward/Bwd/Lateral 3laps with UE involvement   March 3 laps   Pec /lat stretch  2x30  HS/GS Stretches NV   Hip abduction 2x10   Hip flex/ext x10  Squats x10   DLS 3 way  level 1 10 x each wall support   Bicep level 1 /tricep level 5 paddle x10 each  wall support   B shld external rotation level level 5  NV   UE  Rotation with noodle post post shld stretching x10   Post shld stretching UE assist 10 seconds x3   Noodle Pull down elbows bent x10   Ladder plank push up with DNF  x15 NV   Cervical ROM   Rows level 1 paddle x10   Hamstring curls x10  Hip circles x10   Noodle cycle 2 minutes                Current Problem:  1. Neck pain  Follow Up In Physical Therapy      2. Cervical radiculopathy  Follow Up In Physical Therapy

## 2025-04-25 DIAGNOSIS — J01.10 ACUTE FRONTAL SINUSITIS, RECURRENCE NOT SPECIFIED: ICD-10-CM

## 2025-04-25 NOTE — TELEPHONE ENCOUNTER
Recent Visits  Date Type Provider Dept   10/17/24 Office Visit Jose Mcdonald, DO [facility]   09/05/24 Office Visit Jack Morton, DO [facility]   Showing recent visits within past 365 days and meeting all other requirements  Future Appointments  No visits were found meeting these conditions.  Showing future appointments within next 90 days and meeting all other requirements

## 2025-04-26 RX ORDER — LORATADINE AND PSEUDOEPHEDRINE SULFATE 10; 240 MG/1; MG/1
1 TABLET, FILM COATED, EXTENDED RELEASE ORAL DAILY
Qty: 90 TABLET | Refills: 3 | Status: SHIPPED | OUTPATIENT
Start: 2025-04-26

## 2025-04-28 ENCOUNTER — TREATMENT (OUTPATIENT)
Dept: PHYSICAL THERAPY | Facility: CLINIC | Age: 45
End: 2025-04-28
Payer: MEDICAID

## 2025-04-28 DIAGNOSIS — M54.12 CERVICAL RADICULOPATHY: ICD-10-CM

## 2025-04-28 DIAGNOSIS — H83.2X9 VESTIBULAR DISEQUILIBRIUM, UNSPECIFIED LATERALITY: ICD-10-CM

## 2025-04-28 DIAGNOSIS — G89.29 CHRONIC INTRACTABLE HEADACHE, UNSPECIFIED HEADACHE TYPE: ICD-10-CM

## 2025-04-28 DIAGNOSIS — M54.2 NECK PAIN: Primary | ICD-10-CM

## 2025-04-28 DIAGNOSIS — R51.9 CHRONIC INTRACTABLE HEADACHE, UNSPECIFIED HEADACHE TYPE: ICD-10-CM

## 2025-04-28 PROCEDURE — 97164 PT RE-EVAL EST PLAN CARE: CPT | Mod: GP

## 2025-04-28 PROCEDURE — 97110 THERAPEUTIC EXERCISES: CPT | Mod: GP
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
## 2025-04-29 ENCOUNTER — APPOINTMENT (OUTPATIENT)
Dept: PHYSICAL THERAPY | Facility: CLINIC | Age: 45
End: 2025-04-29
Payer: MEDICAID

## 2025-04-30 ENCOUNTER — TREATMENT (OUTPATIENT)
Dept: PHYSICAL THERAPY | Facility: CLINIC | Age: 45
End: 2025-04-30
Payer: MEDICAID

## 2025-04-30 DIAGNOSIS — M54.12 CERVICAL RADICULOPATHY: ICD-10-CM

## 2025-04-30 DIAGNOSIS — M54.2 NECK PAIN: ICD-10-CM

## 2025-04-30 PROCEDURE — 97112 NEUROMUSCULAR REEDUCATION: CPT | Mod: GP

## 2025-04-30 NOTE — PROGRESS NOTES
"Physical Therapy    Physical Therapy Re-evaluation & Treatment    Patient Name: Brii Reeves  MRN: [id number]  Today's Date: 4/30/2025  Time Calculation  Start Time: 1440  Stop Time: 1515  Time Calculation (min): 35 min        Insurance:  Visit: #16  Authorized: 23  Certification: 10/29/2024 - 10/29/2025   Payor: [facility] RUBÉN / Plan: [facility] PLAN / Product Type: *No Product type* /     Subjective:  Patient reports to physical therapy 10 minutes late with noticeable head-related symptoms lasting for 1 hour following the previous session when incorporating assessment of the vestibular system. The symptoms patient experienced are similar to when she has increased head movements at work or during ADLs at home. She admits that she was unable to fill out outcome measure after the last session due to difficulties with reading and focusing after VOR interventions.        Current pain: 5/10; Range: 4-8/10      Objective:  Ellyptical aerobic endurance increases the difficulty of answering questions     Balance   Able to withstand 60 seconds EC on airex foam pad           Treatment:  NMR = 2 units  2-foot balance on foam airex pad = 3x30\" with EC   Rocker board with use of lateral visual disturbance to work on motion sickness   PF/DF EO = 2x10  INV/EV EO = 2x10  Ellyptical with cerebral questioning/focusing   Math problems  Subtractions by 5 from 100 ; 3 from 100   VOR x 1 (60 bpm metronome)   Vertical and horizontal - 4x10 each     Therapeutic Exercise = 0 unit   Seated cervical stretching = 2x15\" holds in each position   Extension   Flexion   Lateral bending       Home exercise plan (HEP): Access Code: [email or web address]  - Seated Gaze Stabilization with Head Nod  - 1 x daily - 7 x weekly - 1 sets - 10 reps  - Seated Gaze Stabilization with Head Rotation  - 1 x daily - 7 x weekly - 1 sets - 10 reps      Diagnosis:  1. Neck pain    2. Cervical radiculopathy              Assessment:   Patient is " able to perform a multitude to vestibular/VOR related interventions in session, initially using eyes closed (EC) on airex foam pad to prepare the system for additional challenging interventions later on in the session. Introduced was use of altered pattern paper while balancing on rocker board to simulate motion-sickness experience when driving or sitting in a car. This did not provoke the same type of symptoms she previously experienced with VOR x1 in the vertical and horizontal direction but enough for her to become aware of abnormal head sensations. Use of cognitive questioning on the [facility] was also utilized to further assess and determine capabilities of the entire system when the cardiovascular system is taxed. Vertical VORx1 only utilized in session today based on remaining time availability and determination that up/down movements appear to reproduce worse symptoms compared to horizontal head movements. Patient is to continue using VORx1 in both directions at home as part of her HEP with also incorporating cognitive loading strategies (such as mathematics) when walking her dog. Patient will continue to benefit from physical therapy services in order to maximize functional outcomes.          Plan:  Continue to determine patient response to interventions in session hours after session and following days to further determine appropriate symptoms       Jerrod Christensen, PT

## 2025-05-02 ENCOUNTER — OFFICE VISIT (OUTPATIENT)
Dept: URGENT CARE | Age: 45
End: 2025-05-02
Payer: MEDICAID

## 2025-05-02 VITALS
RESPIRATION RATE: 20 BRPM | DIASTOLIC BLOOD PRESSURE: 76 MMHG | HEIGHT: 65 IN | HEART RATE: 97 BPM | SYSTOLIC BLOOD PRESSURE: 113 MMHG | TEMPERATURE: 98.2 F | OXYGEN SATURATION: 99 % | BODY MASS INDEX: 20.83 KG/M2 | WEIGHT: 125 LBS

## 2025-05-02 DIAGNOSIS — R82.90 URINE ABNORMALITY: ICD-10-CM

## 2025-05-02 DIAGNOSIS — N30.01 ACUTE CYSTITIS WITH HEMATURIA: Primary | ICD-10-CM

## 2025-05-02 DIAGNOSIS — J06.9 UPPER RESPIRATORY TRACT INFECTION, UNSPECIFIED TYPE: ICD-10-CM

## 2025-05-02 LAB
POC APPEARANCE, URINE: ABNORMAL
POC BILIRUBIN, URINE: NEGATIVE
POC BLOOD, URINE: ABNORMAL
POC COLOR, URINE: ABNORMAL
POC GLUCOSE, URINE: NEGATIVE MG/DL
POC KETONES, URINE: NEGATIVE MG/DL
POC LEUKOCYTES, URINE: ABNORMAL
POC NITRITE,URINE: NEGATIVE
POC PH, URINE: 6 PH
POC PROTEIN, URINE: ABNORMAL MG/DL
POC SPECIFIC GRAVITY, URINE: >=1.03
POC UROBILINOGEN, URINE: 0.2 EU/DL
PREGNANCY TEST URINE, POC: NEGATIVE

## 2025-05-02 RX ORDER — CEPHALEXIN 500 MG/1
500 CAPSULE ORAL 3 TIMES DAILY
Qty: 21 CAPSULE | Refills: 0 | Status: SHIPPED | OUTPATIENT
Start: 2025-05-02 | End: 2025-05-09

## 2025-05-02 ASSESSMENT — ENCOUNTER SYMPTOMS
DYSURIA: 1
SORE THROAT: 1

## 2025-05-02 NOTE — PATIENT INSTRUCTIONS
Antibiotics as directed; take with food. Urine culture pending.  Decongestants, nasal saline pending.  Follow up with new or worsening symptoms.

## 2025-05-02 NOTE — PROGRESS NOTES
"Subjective   Patient ID: Brii Reeves is a 44 y.o. female. She presents today with a chief complaint of Difficulty Urinating, Sore Throat, and Earache (Frequency and pain/pinching feeling after urinating.  Some blood yesterday;   Sore throat, ear pain, headaches x 2 days).    History of Present Illness  Subjective  Brii Reeves is a 44 y.o. female who presents for evaluation of symptoms of a URI. Symptoms include bilateral ear pressure/pain, headache, and sore throat. Onset of symptoms was 2 days ago and has been unchanged since that time. Treatment to date: none.    Subjective  Brii Reeves is a 44 y.o. female who complains of dysuria and hematuria for 1 day. Patient does not have a history of recurrent UTI or pyelonephritis.      Objective  /76 (BP Location: Right arm, Patient Position: Sitting)   Pulse 97   Temp 36.8 °C (98.2 °F)   Resp 20   Ht 1.651 m (5' 5\")   Wt 56.7 kg (125 lb)   LMP 04/20/2025   SpO2 99%   BMI 20.80 kg/m²    General: alert and oriented, in no acute distress  Abdomen: soft, non-tender, without masses or organomegaly  Back: CVA tenderness absent      Assessment/Plan  Diagnoses and associated orders for this visit:    · Urine abnormality   POCT pregnancy, urine manually resulted   POCT UA Automated manually resulted            Difficulty Urinating  Sore Throat   Associated symptoms include ear pain.   Earache   Associated symptoms include a sore throat.       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 She does not have any smokeless tobacco history on file. Drug use questions deferred to the physician. She reports that she does not drink " "alcohol.    Review of Systems  Review of Systems   HENT:  Positive for ear pain and sore throat.    Genitourinary:  Positive for dysuria.                                  Objective    Vitals:    05/02/25 1425   BP: 113/76   BP Location: Right arm   Patient Position: Sitting   Pulse: 97   Resp: 20   Temp: 36.8 °C (98.2 °F)   SpO2: 99%   Weight: 56.7 kg (125 lb)   Height: 1.651 m (5' 5\")     Patient's last menstrual period was 04/20/2025.    Physical Exam  Vitals and nursing note reviewed.   Constitutional:       General: She is not in acute distress.     Appearance: Normal appearance. She is not toxic-appearing.   HENT:      Right Ear: Tympanic membrane, ear canal and external ear normal.      Left Ear: Tympanic membrane, ear canal and external ear normal.      Nose: Rhinorrhea present.      Mouth/Throat:      Mouth: Mucous membranes are moist.      Pharynx: Postnasal drip present.   Eyes:      Extraocular Movements: Extraocular movements intact.      Conjunctiva/sclera: Conjunctivae normal.      Pupils: Pupils are equal, round, and reactive to light.   Cardiovascular:      Rate and Rhythm: Normal rate and regular rhythm.      Pulses: Normal pulses.      Heart sounds: Normal heart sounds.   Pulmonary:      Effort: Pulmonary effort is normal.      Breath sounds: Normal breath sounds. No wheezing, rhonchi or rales.   Abdominal:      General: Abdomen is flat. Bowel sounds are normal.      Palpations: Abdomen is soft.   Musculoskeletal:      Cervical back: Normal range of motion and neck supple. No rigidity or tenderness.   Lymphadenopathy:      Cervical: No cervical adenopathy.   Neurological:      Mental Status: She is alert.         Procedures    Point of Care Test & Imaging Results from this visit  No results found for this visit on 05/02/25.   Imaging  No results found.    Cardiology, Vascular, and Other Imaging  No other imaging results found for the past 2 days      Diagnostic study results (if any) were reviewed " by Lucy Her MD.    Assessment/Plan   Allergies, medications, history, and pertinent labs/EKGs/Imaging reviewed by Lucy Her MD.     Medical Decision Making      Orders and Diagnoses  Diagnoses and all orders for this visit:  Urine abnormality  -     POCT pregnancy, urine manually resulted  -     POCT UA Automated manually resulted      Medical Admin Record      Patient disposition: Home    Electronically signed by Lucy Her MD  2:39 PM           [1] (Not in a hospital admission)  [2]   Past Medical History:  Diagnosis Date    Encounter for other screening for malignant neoplasm of breast     Breast cancer screening    Personal history of other diseases of the musculoskeletal system and connective tissue 11/28/2022    History of low back pain    Personal history of other diseases of the nervous system and sense organs 11/25/2020    History of ear pain    Personal history of other specified conditions 07/25/2019    History of diarrhea    Personal history of other specified conditions 07/31/2019    History of fatigue   [3] History reviewed. No pertinent surgical history.

## 2025-05-05 ENCOUNTER — TELEPHONE (OUTPATIENT)
Dept: PRIMARY CARE | Facility: CLINIC | Age: 45
End: 2025-05-05
Payer: MEDICAID

## 2025-05-05 DIAGNOSIS — N30.01 ACUTE CYSTITIS WITH HEMATURIA: Primary | ICD-10-CM

## 2025-05-05 NOTE — TELEPHONE ENCOUNTER
Pt went to an urgent care on Friday, they gave her-cephalexin (Keflex) 500 mg capsule     She is having side effects like sores in her mouth and in her abdomen area, there is pinching..    Was hoping JE could call in something else.    Please advise    *I did recommend calling that urgent care as well

## 2025-05-06 LAB — BACTERIA UR CULT: ABNORMAL

## 2025-05-06 RX ORDER — NITROFURANTOIN 25; 75 MG/1; MG/1
100 CAPSULE ORAL 2 TIMES DAILY
Qty: 14 CAPSULE | Refills: 0 | Status: SHIPPED | OUTPATIENT
Start: 2025-05-06 | End: 2025-05-13

## 2025-05-06 NOTE — TELEPHONE ENCOUNTER
Pt contacted the UC about symptoms. Possible reaction. She stopped Keflex. Will start on Macrobid as C&S is positive.  Denies sob, cp or pain with deep inspiration, only wants medication change. Per MA was speaking in full sentences. If symptoms worsen advised to go to the ER.

## 2025-05-09 ENCOUNTER — APPOINTMENT (OUTPATIENT)
Dept: PRIMARY CARE | Facility: CLINIC | Age: 45
End: 2025-05-09
Payer: MEDICAID

## 2025-05-11 ENCOUNTER — APPOINTMENT (OUTPATIENT)
Dept: RADIOLOGY | Facility: HOSPITAL | Age: 45
End: 2025-05-11
Payer: MEDICAID

## 2025-05-12 ENCOUNTER — TREATMENT (OUTPATIENT)
Dept: PHYSICAL THERAPY | Facility: CLINIC | Age: 45
End: 2025-05-12
Payer: MEDICAID

## 2025-05-12 DIAGNOSIS — M54.2 NECK PAIN: ICD-10-CM

## 2025-05-12 DIAGNOSIS — R51.9 CHRONIC INTRACTABLE HEADACHE, UNSPECIFIED HEADACHE TYPE: ICD-10-CM

## 2025-05-12 DIAGNOSIS — M54.12 CERVICAL RADICULOPATHY: ICD-10-CM

## 2025-05-12 DIAGNOSIS — H83.2X9 VESTIBULAR DISEQUILIBRIUM, UNSPECIFIED LATERALITY: ICD-10-CM

## 2025-05-12 DIAGNOSIS — G89.29 CHRONIC INTRACTABLE HEADACHE, UNSPECIFIED HEADACHE TYPE: ICD-10-CM

## 2025-05-12 PROCEDURE — 97113 AQUATIC THERAPY/EXERCISES: CPT | Mod: GP,CQ

## 2025-05-12 NOTE — PROGRESS NOTES
Patient Name: Brii Reeves  MRN: [id number]  Today's Date: 5/12/2025  Time Calculation  Start Time: 1040  Stop Time: 1120  Time Calculation (min): 40 min  Insurance:  Visit: #17  Authorized: 23  Certification: 10/29/2024 - 10/29/2025   Payor: [facility] RUBÉN / Plan: [facility] PLAN / Product Type: *No Prod  Subjective:  States that she has  cervical MRI  5/12/2025 .Reports that she attempted to drive longer duration  recently and was unable to complete trip due to increased cervical  symptoms and headache. Drive duration and trip was 3 hours away and was required to turn around in about 1 hour into trip.     Objective:  20% cues for cervical postioning with 40 minute session.     Assessment:   Pt demos improved cervical postural endurance and increased cervical awareness .   Plan:   Follow up with recent testing results    Treatment :   AQUATIC THERAPEUTIC EXERCISE 98154 40 minutes  3 units    Aquatic gait forward/Bwd/Lateral 3laps with UE involvement   March 3 laps   Pec /lat stretch  2x30  HS/GS Stretches NV   Hip abduction 2x10   Hip flex/ext 2x10  Squats x10   DLS 3 way  level 1 10 x each wall support   Bicep level 1 /tricep level 5 paddle x10 each  wall support   B shld external rotation level level 5  NV   UE  Rotation with noodle post post shld stretching x10   Post shld stretching UE assist 10 seconds x3   Noodle Pull down elbows bent x10   Ladder plank push up with DNF  x15 NV   Cervical ROM   Rows alternating level 1 paddle x10   Hamstring curls x10  Hip circles x10   Noodle cycle 2 minutes              Current Problem:  1. Neck pain  Follow Up In Physical Therapy      2. Cervical radiculopathy  Follow Up In Physical Therapy      3. Vestibular disequilibrium, unspecified laterality  Follow Up In Physical Therapy      4. Chronic intractable headache, unspecified headache type  Follow Up In Physical Therapy              Pain:  5/10   Location: Cervical spine

## 2025-05-13 ENCOUNTER — HOSPITAL ENCOUNTER (OUTPATIENT)
Dept: RADIOLOGY | Facility: HOSPITAL | Age: 45
Discharge: HOME | End: 2025-05-13
Payer: MEDICAID

## 2025-05-13 DIAGNOSIS — M54.12 CERVICAL RADICULOPATHY: ICD-10-CM

## 2025-05-13 DIAGNOSIS — M62.838 CERVICAL PARASPINAL MUSCLE SPASM: ICD-10-CM

## 2025-05-13 DIAGNOSIS — M54.2 NECK PAIN: ICD-10-CM

## 2025-05-13 PROCEDURE — 72141 MRI NECK SPINE W/O DYE: CPT

## 2025-05-14 ENCOUNTER — APPOINTMENT (OUTPATIENT)
Dept: PHYSICAL THERAPY | Facility: CLINIC | Age: 45
End: 2025-05-14
Payer: MEDICAID

## 2025-05-15 ENCOUNTER — OFFICE VISIT (OUTPATIENT)
Dept: ORTHOPEDIC SURGERY | Facility: CLINIC | Age: 45
End: 2025-05-15
Payer: MEDICAID

## 2025-05-15 DIAGNOSIS — M54.2 NECK PAIN: Primary | ICD-10-CM

## 2025-05-15 PROCEDURE — 99213 OFFICE O/P EST LOW 20 MIN: CPT | Performed by: ORTHOPAEDIC SURGERY
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
## 2025-05-19 ENCOUNTER — APPOINTMENT (OUTPATIENT)
Dept: PHYSICAL THERAPY | Facility: CLINIC | Age: 45
End: 2025-05-19
Payer: MEDICAID

## 2025-05-21 ENCOUNTER — TREATMENT (OUTPATIENT)
Dept: PHYSICAL THERAPY | Facility: CLINIC | Age: 45
End: 2025-05-21
Payer: MEDICAID

## 2025-05-21 DIAGNOSIS — H83.2X9 VESTIBULAR DISEQUILIBRIUM, UNSPECIFIED LATERALITY: ICD-10-CM

## 2025-05-21 DIAGNOSIS — M54.2 NECK PAIN: ICD-10-CM

## 2025-05-21 DIAGNOSIS — R51.9 CHRONIC INTRACTABLE HEADACHE, UNSPECIFIED HEADACHE TYPE: ICD-10-CM

## 2025-05-21 DIAGNOSIS — G89.29 CHRONIC INTRACTABLE HEADACHE, UNSPECIFIED HEADACHE TYPE: ICD-10-CM

## 2025-05-21 DIAGNOSIS — M54.12 CERVICAL RADICULOPATHY: ICD-10-CM

## 2025-05-21 PROCEDURE — 97113 AQUATIC THERAPY/EXERCISES: CPT | Mod: GP,CQ
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
## 2025-06-04 ENCOUNTER — TREATMENT (OUTPATIENT)
Dept: PHYSICAL THERAPY | Facility: CLINIC | Age: 45
End: 2025-06-04
Payer: MEDICAID

## 2025-06-04 DIAGNOSIS — M54.2 NECK PAIN: ICD-10-CM

## 2025-06-04 DIAGNOSIS — H83.2X9 VESTIBULAR DISEQUILIBRIUM, UNSPECIFIED LATERALITY: ICD-10-CM

## 2025-06-04 DIAGNOSIS — R51.9 CHRONIC INTRACTABLE HEADACHE, UNSPECIFIED HEADACHE TYPE: ICD-10-CM

## 2025-06-04 DIAGNOSIS — M54.12 CERVICAL RADICULOPATHY: ICD-10-CM

## 2025-06-04 DIAGNOSIS — G89.29 CHRONIC INTRACTABLE HEADACHE, UNSPECIFIED HEADACHE TYPE: ICD-10-CM

## 2025-06-04 PROCEDURE — 97113 AQUATIC THERAPY/EXERCISES: CPT | Mod: GP,CQ

## 2025-06-04 NOTE — PROGRESS NOTES
Patient Name: Brii Reeves  MRN: [id number]  Today's Date: 6/4/2025  Time Calculation  Start Time: 1400  Stop Time: 1430  Time Calculation (min): 30 min  Insurance:  Visit: #19  Authorized: 23  Certification: 10/29/2024 - 10/29/2025   Payor: [facility] RUBÉN / Plan: [facility] PLAN / Product Type: *No Prod  Subjective:  Reports that she is now able to perform household activities for approximately 1 hour at a time .Periovusly able to only tolerate  10 minutes .   States that she really enjoys aquatics for it allows her to have a form of exercise routine without increasing overall  symptoms . Plans on joining an aquatic facility to continue aquatics post discharge . States that post land exercises she does have increased cervical symptoms however feels that she is making progress.     Objective:  Fair+ Cervical DNF stabilization with level 5 paddle resistance     Assessment:   Pt is demonstrating improved postural stability and increased postural awareness. Able to progress resistance with good control    Plan:   Cont with progression     Treatment :   AQUATIC THERAPEUTIC EXERCISE 14758 30 minutes  2 units     Aquatic gait forward/Bwd/Lateral 3laps with UE involvement   March 3 laps   Pec /lat stretch  2x30  HS/GS Stretches NV   Hip abduction 2x10   Hip flex/ext 2x10  Squats x10   DLS 3 way  level 5 10 x each wall support   Bicep level 1 /tricep level 5 paddle x10 each  wall support   B shld external rotation level level 5  NV   UE  Rotation with noodle post post shld stretching x10   Post shld stretching UE assist 10 seconds x3   Noodle Pull down elbows straight/ elbows bent x15 each    Ladder plank push up with DNF  x15 NV   Cervical ROM  NV   Rows alternating level 5 paddle x10   Hamstring curls x10  Hip circles x10          Current Problem:  1. Neck pain  Follow Up In Physical Therapy      2. Cervical radiculopathy  Follow Up In Physical Therapy      3. Vestibular disequilibrium,  unspecified laterality  Follow Up In Physical Therapy      4. Chronic intractable headache, unspecified headache type  Follow Up In Physical Therapy

## 2025-06-09 ENCOUNTER — APPOINTMENT (OUTPATIENT)
Dept: PHYSICAL THERAPY | Facility: CLINIC | Age: 45
End: 2025-06-09
Payer: MEDICAID

## 2025-06-16 ENCOUNTER — APPOINTMENT (OUTPATIENT)
Dept: PHYSICAL THERAPY | Facility: CLINIC | Age: 45
End: 2025-06-16
Payer: MEDICAID

## 2025-06-30 ENCOUNTER — APPOINTMENT (OUTPATIENT)
Dept: PHYSICAL THERAPY | Facility: CLINIC | Age: 45
End: 2025-06-30
Payer: MEDICAID

## 2025-07-03 DIAGNOSIS — J30.1 SEASONAL ALLERGIC RHINITIS DUE TO POLLEN: ICD-10-CM

## 2025-07-03 RX ORDER — FLUTICASONE PROPIONATE 50 MCG
1 SPRAY, SUSPENSION (ML) NASAL 2 TIMES DAILY
Qty: 16 G | Refills: 3 | Status: SHIPPED | OUTPATIENT
Start: 2025-07-03

## 2025-07-08 ENCOUNTER — PATIENT OUTREACH (OUTPATIENT)
Dept: CARE COORDINATION | Facility: CLINIC | Age: 45
End: 2025-07-08
Payer: MEDICAID

## 2025-07-08 ENCOUNTER — TELEPHONE (OUTPATIENT)
Dept: PRIMARY CARE | Facility: CLINIC | Age: 45
End: 2025-07-08
Payer: MEDICAID

## 2025-07-08 DIAGNOSIS — Z12.31 ENCOUNTER FOR SCREENING MAMMOGRAM FOR BREAST CANCER: ICD-10-CM

## 2025-07-08 NOTE — TELEPHONE ENCOUNTER
metaxalone (Skelaxin) 800 mg tablet     Pharmacy    [id number] - [place], [place] - [address] AT [facility] [address]  [address], [place] [address]  Phone: [phone]  Fax: [phone]

## 2025-07-22 ENCOUNTER — APPOINTMENT (OUTPATIENT)
Dept: PRIMARY CARE | Facility: CLINIC | Age: 45
End: 2025-07-22
Payer: MEDICAID

## 2025-07-22 VITALS
WEIGHT: 126 LBS | OXYGEN SATURATION: 99 % | BODY MASS INDEX: 20.99 KG/M2 | DIASTOLIC BLOOD PRESSURE: 68 MMHG | SYSTOLIC BLOOD PRESSURE: 112 MMHG | HEIGHT: 65 IN | HEART RATE: 101 BPM

## 2025-07-22 DIAGNOSIS — G44.329 CHRONIC POST-TRAUMATIC HEADACHE, NOT INTRACTABLE: ICD-10-CM

## 2025-07-22 DIAGNOSIS — M54.2 NECK PAIN: ICD-10-CM

## 2025-07-22 DIAGNOSIS — R11.0 NAUSEA: ICD-10-CM

## 2025-07-22 DIAGNOSIS — J30.9 ALLERGIC RHINITIS, UNSPECIFIED SEASONALITY, UNSPECIFIED TRIGGER: ICD-10-CM

## 2025-07-22 DIAGNOSIS — S16.1XXS STRAIN OF NECK MUSCLE, SEQUELA: ICD-10-CM

## 2025-07-22 DIAGNOSIS — M62.838 CERVICAL PARASPINAL MUSCLE SPASM: ICD-10-CM

## 2025-07-22 DIAGNOSIS — S39.012S STRAIN OF LUMBAR REGION, SEQUELA: ICD-10-CM

## 2025-07-22 PROCEDURE — 99214 OFFICE O/P EST MOD 30 MIN: CPT | Performed by: FAMILY MEDICINE

## 2025-07-22 RX ORDER — NORTRIPTYLINE HYDROCHLORIDE 25 MG/1
25 CAPSULE ORAL
COMMUNITY
Start: 2025-01-09

## 2025-07-22 RX ORDER — METAXALONE 800 MG/1
800 TABLET ORAL 2 TIMES DAILY
Qty: 60 TABLET | Refills: 2 | Status: SHIPPED | OUTPATIENT
Start: 2025-07-22

## 2025-07-22 RX ORDER — LORATADINE PSEUDOEPHEDRINE SULFATE 10; 240 MG/1; MG/1
1 TABLET, EXTENDED RELEASE ORAL DAILY
Qty: 30 TABLET | Refills: 2 | Status: SHIPPED | OUTPATIENT
Start: 2025-07-22 | End: 2026-07-22

## 2025-07-22 RX ORDER — PROCHLORPERAZINE MALEATE 10 MG
10 TABLET ORAL 3 TIMES DAILY PRN
Qty: 20 TABLET | Refills: 2 | Status: SHIPPED | OUTPATIENT
Start: 2025-07-22 | End: 2025-09-20

## 2025-07-22 ASSESSMENT — ENCOUNTER SYMPTOMS
HEMATURIA: 0
SPEECH DIFFICULTY: 0
POLYPHAGIA: 0
ADENOPATHY: 0
FLANK PAIN: 0
SORE THROAT: 0
SHORTNESS OF BREATH: 0
NECK STIFFNESS: 0
ACTIVITY CHANGE: 0
FATIGUE: 0
ARTHRALGIAS: 0
DIARRHEA: 0
PHOTOPHOBIA: 0
ABDOMINAL PAIN: 0
HEADACHES: 0
CONSTITUTIONAL NEGATIVE: 1
APPETITE CHANGE: 0
COUGH: 0
PALPITATIONS: 0
STRIDOR: 0
COLOR CHANGE: 0
SEIZURES: 0
BLOOD IN STOOL: 0
SLEEP DISTURBANCE: 0
SINUS PAIN: 0
AGITATION: 0
SINUS PRESSURE: 0
DYSPHORIC MOOD: 0
DYSURIA: 0
TROUBLE SWALLOWING: 0
CONFUSION: 0
DECREASED CONCENTRATION: 0
ABDOMINAL DISTENTION: 0
POLYDIPSIA: 0
MYALGIAS: 0
DIZZINESS: 0
CONSTIPATION: 0
EYE PAIN: 0
RECTAL PAIN: 0
FEVER: 0
NERVOUS/ANXIOUS: 0
NECK PAIN: 1
RHINORRHEA: 0
CHEST TIGHTNESS: 0

## 2025-07-22 NOTE — PROGRESS NOTES
"Subjective   Patient ID: Brii Reeves is a 44 y.o. female who presents for Med Refill (Med refills; neck pain) and Neck Pain.    Med Refill  Associated symptoms include neck pain. Pertinent negatives include no abdominal pain, arthralgias, chest pain, congestion, coughing, fatigue, fever, headaches, myalgias, rash or sore throat.   Neck Pain   Pertinent negatives include no chest pain, fever, headaches, photophobia or trouble swallowing.      Patient reports neck pain. She states therapy makes the neck pain worse.   Review of Systems   Constitutional: Negative.  Negative for activity change, appetite change, fatigue and fever.   HENT:  Negative for congestion, dental problem, ear discharge, ear pain, mouth sores, rhinorrhea, sinus pressure, sinus pain, sore throat, tinnitus and trouble swallowing.    Eyes:  Negative for photophobia, pain and visual disturbance.   Respiratory:  Negative for cough, chest tightness, shortness of breath and stridor.    Cardiovascular:  Negative for chest pain and palpitations.   Gastrointestinal:  Negative for abdominal distention, abdominal pain, blood in stool, constipation, diarrhea and rectal pain.   Endocrine: Negative for cold intolerance, heat intolerance, polydipsia, polyphagia and polyuria.   Genitourinary:  Negative for dysuria, flank pain, hematuria and urgency.   Musculoskeletal:  Positive for neck pain. Negative for arthralgias, gait problem, myalgias and neck stiffness.   Skin:  Negative for color change and rash.   Allergic/Immunologic: Negative for environmental allergies and food allergies.   Neurological:  Negative for dizziness, seizures, syncope, speech difficulty and headaches.   Hematological:  Negative for adenopathy.   Psychiatric/Behavioral:  Negative for agitation, confusion, decreased concentration, dysphoric mood and sleep disturbance. The patient is not nervous/anxious.        Objective   /68   Pulse 101   Ht 1.651 m (5' 5\")   Wt 57.2 kg (126 lb)  "  SpO2 99%   BMI 20.97 kg/m²     Physical Exam  Vitals reviewed.   Constitutional:       General: She is not in acute distress.     Appearance: Normal appearance. She is normal weight. She is not ill-appearing or diaphoretic.   HENT:      Head: Normocephalic.      Right Ear: Tympanic membrane and external ear normal.      Left Ear: Tympanic membrane and external ear normal.      Nose: Nose normal. No congestion.      Mouth/Throat:      Pharynx: No posterior oropharyngeal erythema.     Eyes:      General:         Right eye: No discharge.         Left eye: No discharge.      Extraocular Movements: Extraocular movements intact.      Conjunctiva/sclera: Conjunctivae normal.      Pupils: Pupils are equal, round, and reactive to light.       Cardiovascular:      Rate and Rhythm: Normal rate and regular rhythm.      Pulses: Normal pulses.      Heart sounds: Normal heart sounds. No murmur heard.  Pulmonary:      Effort: Pulmonary effort is normal. No respiratory distress.      Breath sounds: Normal breath sounds. No wheezing or rales.   Chest:      Chest wall: No tenderness.   Abdominal:      General: Abdomen is flat. Bowel sounds are normal. There is no distension.      Palpations: There is no mass.      Tenderness: There is no abdominal tenderness. There is no guarding.     Musculoskeletal:         General: No tenderness. Normal range of motion.      Cervical back: Normal range of motion and neck supple. No tenderness.      Right lower leg: No edema.      Left lower leg: No edema.     Skin:     General: Skin is dry.      Coloration: Skin is not jaundiced.      Findings: No bruising, erythema or rash.     Neurological:      General: No focal deficit present.      Mental Status: She is alert and oriented to person, place, and time. Mental status is at baseline.      Cranial Nerves: No cranial nerve deficit.      Sensory: No sensory deficit.      Coordination: Coordination normal.      Gait: Gait normal.     Psychiatric:          Mood and Affect: Mood normal.         Thought Content: Thought content normal.         Judgment: Judgment normal.         Assessment/Plan   Problem List Items Addressed This Visit           ICD-10-CM    Cervical paraspinal muscle spasm M62.838    Strain of lumbar region S39.012A    Cervical strain S16.1XXA    Neck pain M54.2     Other Visit Diagnoses         Codes      Chronic post-traumatic headache, not intractable     G44.329      Nausea     R11.0        Patient advised to take nortriptyline for four weeks    Cymbalta next if med does not work    US carotid artery ordered     Start Compazine 10 mg     Handwritten prescription for warm water therapy was given to patient    Scribe Attestation  By signing my name below, I, CHUCHO Mayen , Scribe   attest that this documentation has been prepared under the direction and in the presence of Jose Mcdonald DO.     All medical record entries made by the Scribe were at my direction and personally dictated by me. I have reviewed the chart and agree that the record accurately reflects my personal performance of the history, physical exam, discussion and plan.

## 2025-07-29 ENCOUNTER — HOSPITAL ENCOUNTER (OUTPATIENT)
Dept: RADIOLOGY | Facility: CLINIC | Age: 45
Discharge: HOME | End: 2025-07-29
Payer: MEDICAID

## 2025-07-29 DIAGNOSIS — G44.329 CHRONIC POST-TRAUMATIC HEADACHE, NOT INTRACTABLE: ICD-10-CM

## 2025-07-29 DIAGNOSIS — M54.2 NECK PAIN: ICD-10-CM

## 2025-07-29 PROCEDURE — 93880 EXTRACRANIAL BILAT STUDY: CPT | Performed by: RADIOLOGY

## 2025-07-29 PROCEDURE — 93880 EXTRACRANIAL BILAT STUDY: CPT

## 2025-10-21 ENCOUNTER — APPOINTMENT (OUTPATIENT)
Dept: PRIMARY CARE | Facility: CLINIC | Age: 45
End: 2025-10-21
Payer: MEDICAID